# Patient Record
Sex: FEMALE | Race: WHITE | NOT HISPANIC OR LATINO | URBAN - NONMETROPOLITAN AREA
[De-identification: names, ages, dates, MRNs, and addresses within clinical notes are randomized per-mention and may not be internally consistent; named-entity substitution may affect disease eponyms.]

---

## 2017-06-02 ENCOUNTER — OCCUPATIONAL MEDICINE (OUTPATIENT)
Dept: URGENT CARE | Facility: PHYSICIAN GROUP | Age: 22
End: 2017-06-02

## 2017-06-02 ENCOUNTER — HOSPITAL ENCOUNTER (OUTPATIENT)
Facility: MEDICAL CENTER | Age: 22
End: 2017-06-02
Attending: FAMILY MEDICINE
Payer: COMMERCIAL

## 2017-06-02 VITALS
TEMPERATURE: 98.2 F | HEART RATE: 74 BPM | HEIGHT: 61 IN | DIASTOLIC BLOOD PRESSURE: 82 MMHG | WEIGHT: 106.2 LBS | OXYGEN SATURATION: 98 % | BODY MASS INDEX: 20.05 KG/M2 | RESPIRATION RATE: 16 BRPM | SYSTOLIC BLOOD PRESSURE: 102 MMHG

## 2017-06-02 DIAGNOSIS — Z02.1 PRE-EMPLOYMENT DRUG SCREENING: ICD-10-CM

## 2017-06-02 DIAGNOSIS — Z02.1 PRE-EMPLOYMENT EXAMINATION: ICD-10-CM

## 2017-06-02 LAB
AMP AMPHETAMINE: NORMAL
COC COCAINE: NORMAL
INT CON NEG: NORMAL
INT CON POS: NORMAL
MET METHAMPHETAMINES: NORMAL
OPI OPIATES: NORMAL
PCP PHENCYCLIDINE: NORMAL
POC DRUG COMMENT 753798-OCCUPATIONAL HEALTH: NORMAL
THC: NORMAL

## 2017-06-02 PROCEDURE — 86480 TB TEST CELL IMMUN MEASURE: CPT | Performed by: FAMILY MEDICINE

## 2017-06-02 PROCEDURE — 80305 DRUG TEST PRSMV DIR OPT OBS: CPT | Performed by: FAMILY MEDICINE

## 2017-06-02 PROCEDURE — 8915 PR COMPREHENSIVE PHYSICAL: Performed by: FAMILY MEDICINE

## 2017-06-02 RX ORDER — TRAMADOL HYDROCHLORIDE 50 MG/1
50 TABLET ORAL EVERY 4 HOURS PRN
COMMUNITY
End: 2017-07-05

## 2017-06-02 RX ORDER — CITALOPRAM HYDROBROMIDE 10 MG/1
10 TABLET ORAL DAILY
COMMUNITY
End: 2018-09-09

## 2017-06-02 RX ORDER — GABAPENTIN 300 MG/1
300 CAPSULE ORAL 3 TIMES DAILY
COMMUNITY
End: 2018-09-09

## 2017-06-02 RX ORDER — TRAZODONE HYDROCHLORIDE 100 MG/1
100 TABLET ORAL NIGHTLY
COMMUNITY
End: 2017-07-05

## 2017-06-02 NOTE — MR AVS SNAPSHOT
"Regan Mckeon   2017 9:20 AM   Occupational Medicine   MRN: [id number]    Department:  [facility]   Dept Phone:  [phone]    Description:  Female : 1995   Provider:  Joao López M.D.           Reason for Visit     Employment Physical           Allergies as of 2017     Allergen Noted Reactions    Morphine 2017         You were diagnosed with     Pre-employment examination   [232417]       Pre-employment drug screening   [321172]         Vital Signs     Blood Pressure Pulse Temperature Respirations Height Weight    102/82 mmHg 74 36.8 °C (98.2 °F) 16 1.549 m (5' 1\") 48.172 kg (106 lb 3.2 oz)    Body Mass Index Oxygen Saturation Smoking Status             20.08 kg/m2 98% Current Every Day Smoker         Basic Information     Date Of Birth Sex Race Ethnicity Preferred Language    1995 Female White Non- English      Health Maintenance     Patient has no pending health maintenance at this time      Results     POCT 6 Panel Urine Drug Screen      Component    AMPHETAMINE    POC THC    COCAINE    OPIATES    PHENCYCLIDINE    METHAMPHETAMINES    POC Urine Drug Screen Comment    NEG    Internal Control Positive    Valid    Internal Control Negative    Valid                        Current Immunizations     No immunizations on file.      Below and/or attached are the medications your provider expects you to take. Review all of your home medications and newly ordered medications with your provider and/or pharmacist. Follow medication instructions as directed by your provider and/or pharmacist. Please keep your medication list with you and share with your provider. Update the information when medications are discontinued, doses are changed, or new medications (including over-the-counter products) are added; and carry medication information at all times in the event of emergency situations     Allergies:  (Not on file)          Medications  Valid as of: 2017 -  1:08 PM   "    Generic Name Brand Name Tablet Size Instructions for use    Citalopram Hydrobromide (Tab) CELEXA 10 MG Take 10 mg by mouth every day.        Gabapentin (Cap) NEURONTIN 300 MG Take 300 mg by mouth 3 times a day.        TraMADol HCl (Tab) ULTRAM 50 MG Take 50 mg by mouth every four hours as needed.        TraZODone HCl (Tab) DESYREL 100 MG Take 100 mg by mouth every evening.        .                 Medicines prescribed today were sent to:     None      Medication refill instructions:       If your prescription bottle indicates you have medication refills left, it is not necessary to call your provider’s office. Please contact your pharmacy and they will refill your medication.    If your prescription bottle indicates you do not have any refills left, you may request refills at any time through one of the following ways: The online VoIPshield Systems system (except Urgent Care), by calling your provider’s office, or by asking your pharmacy to contact your provider’s office with a refill request. Medication refills are processed only during regular business hours and may not be available until the next business day. Your provider may request additional information or to have a follow-up visit with you prior to refilling your medication.   *Please Note: Medication refills are assigned a new Rx number when refilled electronically. Your pharmacy may indicate that no refills were authorized even though a new prescription for the same medication is available at the pharmacy. Please request the medicine by name with the pharmacy before contacting your provider for a refill.        Your To Do List     Future Labs/Procedures Complete By Expires    QuantiFERON-TB Gold [TB TEST CELL IMM MEASURE AG]  As directed 6/2/2018         VoIPshield Systems Access Code: [email or web address]  Expires: 7/2/2017  1:08 PM    Your email address is not on file at [facility].  Email Addresses are required for you to sign up for VoIPshield Systems, please contact [phone]  to verify your personal information and to provide your email address prior to attempting to register for cycleWood Solutionst.    Regan Mckeon  [address] [place] [address] [place] [address]    FAD ? IOhart  A secure, online tool to manage your health information     [employer]’s GoodApril® is a secure, online tool that connects you to your personalized health information from the privacy of your home -- day or night - making it very easy for you to manage your healthcare. Once the activation process is completed, you can even access your medical information using the GoodApril fredi, which is available for free in the Apple Fredi store or Google Play store.     To learn more about GoodApril, visit [email or web address]    There are two levels of access available (as shown below):   My Chart Features  [facility] Primary Care Doctor [facility]  Specialists [facility]  Urgent  Care Non-[facility] Primary Care Doctor   Email your healthcare team securely and privately 24/7 X X X    Manage appointments: schedule your next appointment; view details of past/upcoming appointments X      Request prescription refills. X      View recent personal medical records, including lab and immunizations X X X X   View health record, including health history, allergies, medications X X X X   Read reports about your outpatient visits, procedures, consult and ER notes X X X X   See your discharge summary, which is a recap of your hospital and/or ER visit that includes your diagnosis, lab results, and care plan X X  X     How to register for cycleWood Solutionst:  Once your e-mail address has been verified, follow the following steps to sign up for cycleWood Solutionst.     1. Go to  [email or web address]  2. Click on the Sign Up Now box, which takes you to the New Member Sign Up page. You will need to provide the following information:  a. Enter your GoodApril Access Code exactly as it appears at the top of this page. (You will not need to use this code after you’ve completed the sign-up process. If you do not  sign up before the expiration date, you must request a new code.)   b. Enter your date of birth.   c. Enter your home email address.   d. Click Submit, and follow the next screen’s instructions.  3. Create a Bio2 Technologies ID. This will be your Bio2 Technologies login ID and cannot be changed, so think of one that is secure and easy to remember.  4. Create a Bio2 [employer] password. You can change your password at any time.  5. Enter your Password Reset Question and Answer. This can be used at a later time if you forget your password.   6. Enter your e-mail address. This allows you to receive e-mail notifications when new information is available in Bio2 Technologies.  7. Click Sign Up. You can now view your health information.    For assistance activating your [employer] account, call [phone]         Quit Tobacco Information     Do you want to quit using tobacco?    Quitting tobacco decreases risks of cancer, heart and lung disease, increases life expectancy, improves sense of taste and smell, and increases spending money, among other benefits.    If you are thinking about quitting, we can help.  • [facility] Quit Tobacco Program: 846.572.9454  o Program occurs weekly for four weeks and includes pharmacist consultation on products to support quitting smoking or chewing tobacco. A provider referral is needed for pharmacist consultation.  • Tobacco Users Help Hotline: 5-267-QUIT-NOW (554-8940) or https://[place].quitlogix.org/  o Free, confidential telephone and online coaching for [place] residents. Sessions are designed on a schedule that is convenient for you. Eligible clients receive free nicotine replacement therapy.  • Nationally: www.smokefree.gov  o Information and professional assistance to support both immediate and long-term needs as you become, and remain, a non-smoker. Smokefree.gov allows you to choose the help that best fits your needs.

## 2017-06-05 LAB
M TB TUBERC IFN-G BLD QL: NEGATIVE
M TB TUBERC IFN-G/MITOGEN IGNF BLD: 0.01
M TB TUBERC IGNF/MITOGEN IGNF CONTROL: 70.05 [IU]/ML
MITOGEN IGNF BCKGRD COR BLD-ACNC: 0.02 [IU]/ML

## 2017-07-05 ENCOUNTER — OFFICE VISIT (OUTPATIENT)
Dept: URGENT CARE | Facility: PHYSICIAN GROUP | Age: 22
End: 2017-07-05
Payer: MEDICAID

## 2017-07-05 ENCOUNTER — HOSPITAL ENCOUNTER (EMERGENCY)
Facility: MEDICAL CENTER | Age: 22
End: 2017-07-05
Attending: EMERGENCY MEDICINE
Payer: MEDICAID

## 2017-07-05 ENCOUNTER — APPOINTMENT (OUTPATIENT)
Dept: RADIOLOGY | Facility: MEDICAL CENTER | Age: 22
End: 2017-07-05
Attending: EMERGENCY MEDICINE
Payer: MEDICAID

## 2017-07-05 VITALS
OXYGEN SATURATION: 98 % | WEIGHT: 101 LBS | HEIGHT: 61 IN | HEART RATE: 83 BPM | BODY MASS INDEX: 19.07 KG/M2 | DIASTOLIC BLOOD PRESSURE: 80 MMHG | SYSTOLIC BLOOD PRESSURE: 120 MMHG | TEMPERATURE: 98.5 F | RESPIRATION RATE: 18 BRPM

## 2017-07-05 VITALS
SYSTOLIC BLOOD PRESSURE: 96 MMHG | HEART RATE: 82 BPM | WEIGHT: 99.87 LBS | HEIGHT: 61 IN | RESPIRATION RATE: 18 BRPM | DIASTOLIC BLOOD PRESSURE: 64 MMHG | OXYGEN SATURATION: 97 % | TEMPERATURE: 98.8 F | BODY MASS INDEX: 18.86 KG/M2

## 2017-07-05 DIAGNOSIS — R10.31 RIGHT LOWER QUADRANT ABDOMINAL PAIN: ICD-10-CM

## 2017-07-05 DIAGNOSIS — R11.2 NAUSEA AND VOMITING, INTRACTABILITY OF VOMITING NOT SPECIFIED, UNSPECIFIED VOMITING TYPE: ICD-10-CM

## 2017-07-05 DIAGNOSIS — R10.31 RIGHT LOWER QUADRANT PAIN: ICD-10-CM

## 2017-07-05 LAB
ALBUMIN SERPL BCP-MCNC: 5.4 G/DL (ref 3.2–4.9)
ALBUMIN/GLOB SERPL: 1.5 G/DL
ALP SERPL-CCNC: 87 U/L (ref 30–99)
ALT SERPL-CCNC: 19 U/L (ref 2–50)
AMPHET UR QL SCN: NEGATIVE
ANION GAP SERPL CALC-SCNC: 13 MMOL/L (ref 0–11.9)
APPEARANCE UR: CLEAR
APPEARANCE UR: CLEAR
AST SERPL-CCNC: 18 U/L (ref 12–45)
BARBITURATES UR QL SCN: NEGATIVE
BASOPHILS # BLD AUTO: 0.6 % (ref 0–1.8)
BASOPHILS # BLD: 0.05 K/UL (ref 0–0.12)
BENZODIAZ UR QL SCN: NEGATIVE
BILIRUB SERPL-MCNC: 1 MG/DL (ref 0.1–1.5)
BILIRUB UR QL STRIP.AUTO: NEGATIVE
BILIRUB UR STRIP-MCNC: NEGATIVE MG/DL
BUN SERPL-MCNC: 7 MG/DL (ref 8–22)
BZE UR QL SCN: NEGATIVE
CALCIUM SERPL-MCNC: 10.7 MG/DL (ref 8.5–10.5)
CANNABINOIDS UR QL SCN: NEGATIVE
CHLORIDE SERPL-SCNC: 105 MMOL/L (ref 96–112)
CO2 SERPL-SCNC: 19 MMOL/L (ref 20–33)
COLOR UR AUTO: NORMAL
COLOR UR: YELLOW
CREAT SERPL-MCNC: 0.9 MG/DL (ref 0.5–1.4)
CULTURE IF INDICATED INDCX: NO UA CULTURE
EOSINOPHIL # BLD AUTO: 0.05 K/UL (ref 0–0.51)
EOSINOPHIL NFR BLD: 0.6 % (ref 0–6.9)
ERYTHROCYTE [DISTWIDTH] IN BLOOD BY AUTOMATED COUNT: 46.9 FL (ref 35.9–50)
GFR SERPL CREATININE-BSD FRML MDRD: >60 ML/MIN/1.73 M 2
GLOBULIN SER CALC-MCNC: 3.7 G/DL (ref 1.9–3.5)
GLUCOSE SERPL-MCNC: 72 MG/DL (ref 65–99)
GLUCOSE UR STRIP.AUTO-MCNC: NEGATIVE MG/DL
GLUCOSE UR STRIP.AUTO-MCNC: NEGATIVE MG/DL
HCT VFR BLD AUTO: 52.2 % (ref 37–47)
HGB BLD-MCNC: 17.8 G/DL (ref 12–16)
IMM GRANULOCYTES # BLD AUTO: 0.02 K/UL (ref 0–0.11)
IMM GRANULOCYTES NFR BLD AUTO: 0.2 % (ref 0–0.9)
INT CON NEG: NEGATIVE
INT CON POS: POSITIVE
KETONES UR STRIP.AUTO-MCNC: 15 MG/DL
KETONES UR STRIP.AUTO-MCNC: NEGATIVE MG/DL
LEUKOCYTE ESTERASE UR QL STRIP.AUTO: NEGATIVE
LEUKOCYTE ESTERASE UR QL STRIP.AUTO: NEGATIVE
LIPASE SERPL-CCNC: 18 U/L (ref 11–82)
LYMPHOCYTES # BLD AUTO: 2.63 K/UL (ref 1–4.8)
LYMPHOCYTES NFR BLD: 31.8 % (ref 22–41)
MCH RBC QN AUTO: 32.2 PG (ref 27–33)
MCHC RBC AUTO-ENTMCNC: 34.1 G/DL (ref 33.6–35)
MCV RBC AUTO: 94.4 FL (ref 81.4–97.8)
METHADONE UR QL SCN: NEGATIVE
MICRO URNS: ABNORMAL
MONOCYTES # BLD AUTO: 0.53 K/UL (ref 0–0.85)
MONOCYTES NFR BLD AUTO: 6.4 % (ref 0–13.4)
NEUTROPHILS # BLD AUTO: 4.99 K/UL (ref 2–7.15)
NEUTROPHILS NFR BLD: 60.4 % (ref 44–72)
NITRITE UR QL STRIP.AUTO: NEGATIVE
NITRITE UR QL STRIP.AUTO: NEGATIVE
NRBC # BLD AUTO: 0 K/UL
NRBC BLD AUTO-RTO: 0 /100 WBC
OPIATES UR QL SCN: POSITIVE
OXYCODONE UR QL SCN: NEGATIVE
PCP UR QL SCN: NEGATIVE
PH UR STRIP.AUTO: 7 [PH]
PH UR STRIP.AUTO: 7 [PH] (ref 5–8)
PLATELET # BLD AUTO: 309 K/UL (ref 164–446)
PMV BLD AUTO: 10.3 FL (ref 9–12.9)
POC URINE PREGNANCY TEST: NEGATIVE
POTASSIUM SERPL-SCNC: 3.6 MMOL/L (ref 3.6–5.5)
PROPOXYPH UR QL SCN: NEGATIVE
PROT SERPL-MCNC: 9.1 G/DL (ref 6–8.2)
PROT UR QL STRIP: NEGATIVE MG/DL
PROT UR QL STRIP: NEGATIVE MG/DL
RBC # BLD AUTO: 5.53 M/UL (ref 4.2–5.4)
RBC UR QL AUTO: NEGATIVE
RBC UR QL AUTO: NEGATIVE
SODIUM SERPL-SCNC: 137 MMOL/L (ref 135–145)
SP GR UR STRIP.AUTO: 1.01
SP GR UR STRIP.AUTO: 1.03
UROBILINOGEN UR STRIP-MCNC: NEGATIVE MG/DL
WBC # BLD AUTO: 8.3 K/UL (ref 4.8–10.8)

## 2017-07-05 PROCEDURE — 99285 EMERGENCY DEPT VISIT HI MDM: CPT

## 2017-07-05 PROCEDURE — 80053 COMPREHEN METABOLIC PANEL: CPT

## 2017-07-05 PROCEDURE — 81003 URINALYSIS AUTO W/O SCOPE: CPT | Mod: 59

## 2017-07-05 PROCEDURE — 96375 TX/PRO/DX INJ NEW DRUG ADDON: CPT

## 2017-07-05 PROCEDURE — 81002 URINALYSIS NONAUTO W/O SCOPE: CPT | Performed by: PHYSICIAN ASSISTANT

## 2017-07-05 PROCEDURE — 700111 HCHG RX REV CODE 636 W/ 250 OVERRIDE (IP): Performed by: EMERGENCY MEDICINE

## 2017-07-05 PROCEDURE — 80307 DRUG TEST PRSMV CHEM ANLYZR: CPT

## 2017-07-05 PROCEDURE — 96374 THER/PROPH/DIAG INJ IV PUSH: CPT | Mod: XU

## 2017-07-05 PROCEDURE — 85025 COMPLETE CBC W/AUTO DIFF WBC: CPT

## 2017-07-05 PROCEDURE — 700117 HCHG RX CONTRAST REV CODE 255: Performed by: EMERGENCY MEDICINE

## 2017-07-05 PROCEDURE — 81025 URINE PREGNANCY TEST: CPT | Performed by: PHYSICIAN ASSISTANT

## 2017-07-05 PROCEDURE — 99203 OFFICE O/P NEW LOW 30 MIN: CPT | Mod: 25 | Performed by: PHYSICIAN ASSISTANT

## 2017-07-05 PROCEDURE — 83690 ASSAY OF LIPASE: CPT

## 2017-07-05 PROCEDURE — 74177 CT ABD & PELVIS W/CONTRAST: CPT

## 2017-07-05 RX ORDER — ONDANSETRON 2 MG/ML
4 INJECTION INTRAMUSCULAR; INTRAVENOUS ONCE
Status: COMPLETED | OUTPATIENT
Start: 2017-07-05 | End: 2017-07-05

## 2017-07-05 RX ORDER — ONDANSETRON 4 MG/1
4 TABLET, FILM COATED ORAL EVERY 8 HOURS PRN
Qty: 5 TAB | Refills: 1 | Status: SHIPPED | OUTPATIENT
Start: 2017-07-05 | End: 2018-01-25

## 2017-07-05 RX ORDER — HYDROCODONE BITARTRATE AND ACETAMINOPHEN 5; 325 MG/1; MG/1
1 TABLET ORAL EVERY 4 HOURS PRN
Qty: 6 TAB | Refills: 0 | Status: SHIPPED | OUTPATIENT
Start: 2017-07-05 | End: 2018-01-25

## 2017-07-05 RX ADMIN — ONDANSETRON 4 MG: 2 INJECTION INTRAMUSCULAR; INTRAVENOUS at 17:07

## 2017-07-05 RX ADMIN — IOHEXOL 100 ML: 350 INJECTION, SOLUTION INTRAVENOUS at 17:23

## 2017-07-05 RX ADMIN — HYDROMORPHONE HYDROCHLORIDE 0.5 MG: 1 INJECTION, SOLUTION INTRAMUSCULAR; INTRAVENOUS; SUBCUTANEOUS at 17:07

## 2017-07-05 ASSESSMENT — ENCOUNTER SYMPTOMS
NAUSEA: 1
DIARRHEA: 0
ABDOMINAL PAIN: 1
FEVER: 0
VOMITING: 1
SHORTNESS OF BREATH: 0

## 2017-07-05 ASSESSMENT — LIFESTYLE VARIABLES: DO YOU DRINK ALCOHOL: NO

## 2017-07-05 NOTE — ED AVS SNAPSHOT
7/5/2017    Regan Mckeon  [address] [place] [address]    Dear Regan:    [facility] wants to ensure your discharge home is safe and you or your loved ones have had all of your questions answered regarding your care after you leave the hospital.    Below is a list of resources and contact information should you have any questions regarding your hospital stay, follow-up instructions, or active medical symptoms.    Questions or Concerns Regarding… Contact   Medical Questions Related to Your Discharge  (7 days a week, 8am-5pm) Contact a Nurse Care Coordinator   [phone]   Medical Questions Not Related to Your Discharge  (24 hours a day / 7 days a week)  Contact the Nurse Health Line   [phone]    Medications or Discharge Instructions Refer to your discharge packet   or contact your [facility] Primary Care Provider   [phone]   Follow-up Appointment(s) Schedule your appointment via Cloudsnap   or contact Scheduling [phone]   Billing Review your statement via Cloudsnap  or contact Billing [phone]   Medical Records Review your records via Cloudsnap   or contact Medical Records [phone]     You may receive a telephone call within two days of discharge. This call is to make certain you understand your discharge instructions and have the opportunity to have any questions answered. You can also easily access your medical information, test results and upcoming appointments via the Cloudsnap free online health management tool. You can learn more and sign up at [employer]/Cloudsnap. For assistance setting up your Cloudsnap account, please call [phone].    Once again, we want to ensure your discharge home is safe and that you have a clear understanding of any next steps in your care. If you have any questions or concerns, please do not hesitate to contact us, we are here for you. Thank you for choosing [facility] for your healthcare needs.    Sincerely,    Your [facility] Healthcare Team

## 2017-07-05 NOTE — ED AVS SNAPSHOT
Teranode Access Code: [email or web address]  Expires: 8/4/2017  1:03 PM    Your email address is not on file at [facility].  Email Addresses are required for you to sign up for Teranode, please contact [phone] to verify your personal information and to provide your email address prior to attempting to register for Teranode.    [place] [address]    Teranode  A secure, online tool to manage your health information     [employer]’s Teranode® is a secure, online tool that connects you to your personalized health information from the privacy of your home -- day or night - making it very easy for you to manage your healthcare. Once the activation process is completed, you can even access your medical information using the Teranode fredi, which is available for free in the Apple Fredi store or Google Play store.     To learn more about Teranode, visit [email or web address] [email or web address]/Teranode    There are two levels of access available (as shown below):   My Chart Features  [facility] Primary Care Doctor [facility]  Specialists [facility]  Urgent  Care Non-[facility] Primary Care Doctor   Email your healthcare team securely and privately 24/7 X X X    Manage appointments: schedule your next appointment; view details of past/upcoming appointments X      Request prescription refills. X      View recent personal medical records, including lab and immunizations X X X X   View health record, including health history, allergies, medications X X X X   Read reports about your outpatient visits, procedures, consult and ER notes X X X X   See your discharge summary, which is a recap of your hospital and/or ER visit that includes your diagnosis, lab results, and care plan X X  X     How to register for Teranode:  Once your e-mail address has been verified, follow the following steps to sign up for Teranode.     1. Go to  [email or web address]  2. Click on the Sign Up Now box, which takes you to the New Member Sign Up page. You will need  to provide the following information:  a. Enter your Filmmortal Access Code exactly as it appears at the top of this page. (You will not need to use this code after you’ve completed the sign-up process. If you do not sign up before the expiration date, you must request a new code.)   b. Enter your date of birth.   c. Enter your home email address.   d. Click Submit, and follow the next screen’s instructions.  3. Create a Filmmortal ID. This will be your Filmmortal login ID and cannot be changed, so think of one that is secure and easy to remember.  4. Create a Filmmortal password. You can change your password at any time.  5. Enter your Password Reset Question and Answer. This can be used at a later time if you forget your password.   6. Enter your e-mail address. This allows you to receive e-mail notifications when new information is available in Filmmortal.  7. Click Sign Up. You can now view your health information.    For assistance activating your Filmmortal account, call [phone]

## 2017-07-05 NOTE — MR AVS SNAPSHOT
"        Regan Mckeon   2017 12:10 PM   Office Visit   MRN: [id number]    Department:  [facility]   Dept Phone:  [phone]    Description:  Female : 1995   Provider:  Niecy Hammer PA-C           Reason for Visit     Abdominal Pain LRQ pain, vomiting x 2 days      Allergies as of 2017     Allergen Noted Reactions    Morphine 2017         You were diagnosed with     Right lower quadrant pain   [806601]       Nausea and vomiting, intractability of vomiting not specified, unspecified vomiting type   [5607368]         Vital Signs     Blood Pressure Pulse Temperature Respirations Height Weight    120/80 mmHg 83 36.9 °C (98.5 °F) 18 1.549 m (5' 0.98\") 45.813 kg (101 lb)    Body Mass Index Oxygen Saturation Smoking Status             19.09 kg/m2 98% Current Every Day Smoker         Basic Information     Date Of Birth Sex Race Ethnicity Preferred Language    1995 Female White Non- English      Health Maintenance        Date Due Completion Dates    IMM HEP B VACCINE (1 of 3 - Primary Series) 1995 ---    IMM HEP A VACCINE (1 of 2 - Standard Series) 1996 ---    IMM HPV VACCINE (1 of 3 - Female 3 Dose Series) 2006 ---    IMM VARICELLA (CHICKENPOX) VACCINE (1 of 2 - 2 Dose Adolescent Series) 2008 ---    IMM DTaP/Tdap/Td Vaccine (1 - Tdap) 2014 ---    PAP SMEAR 2016 ---    IMM INFLUENZA (1) 2017 ---            Results     POCT PREGNANCY      Component Value Standard Range & Units    POC Urine Pregnancy Test negative Negative    Internal Control Positive Positive     Internal Control Negative Negative                 POCT Urinalysis      Component Value Standard Range & Units    POC Color light pale Negative    POC Appearance clear Negative    POC Leukocyte Esterase negative Negative    POC Nitrites negative Negative    POC Urobiligen negative Negative (0.2) mg/dL    POC Protein negative Negative mg/dL    POC Urine PH 7.0 5.0 - 8.0    POC Blood negative " Negative    POC Specific Gravity 1.010 <1.005 - >1.030    POC Ketones negative Negative mg/dL    POC Biliruben negative Negative mg/dL    POC Glucose negative Negative mg/dL                        Current Immunizations     No immunizations on file.      Below and/or attached are the medications your provider expects you to take. Review all of your home medications and newly ordered medications with your provider and/or pharmacist. Follow medication instructions as directed by your provider and/or pharmacist. Please keep your medication list with you and share with your provider. Update the information when medications are discontinued, doses are changed, or new medications (including over-the-counter products) are added; and carry medication information at all times in the event of emergency situations     Allergies:  MORPHINE - (reactions not documented)               Medications  Valid as of: July 05, 2017 -  1:03 PM    Generic Name Brand Name Tablet Size Instructions for use    Citalopram Hydrobromide (Tab) CELEXA 10 MG Take 10 mg by mouth every day.        Gabapentin (Cap) NEURONTIN 300 MG Take 300 mg by mouth 3 times a day.        TraMADol HCl (Tab) ULTRAM 50 MG Take 50 mg by mouth every four hours as needed.        TraZODone HCl (Tab) DESYREL 100 MG Take 100 mg by mouth every evening.        .                 Medicines prescribed today were sent to:     [facility] [address] [place] [address] AT [place] [address] [address] [place] [address] [id number] [facility] [address]    Phone: [phone] Fax: [phone]    Open 24 Hours?: No      Medication refill instructions:       If your prescription bottle indicates you have medication refills left, it is not necessary to call your provider’s office. Please contact your pharmacy and they will refill your medication.    If your prescription bottle indicates you do not have any refills left, you may request refills at any time through one of  the following ways: The online MyWobile system (except Urgent Care), by calling your provider’s office, or by asking your pharmacy to contact your provider’s office with a refill request. Medication refills are processed only during regular business hours and may not be available until the next business day. Your provider may request additional information or to have a follow-up visit with you prior to refilling your medication.   *Please Note: Medication refills are assigned a new Rx number when refilled electronically. Your pharmacy may indicate that no refills were authorized even though a new prescription for the same medication is available at the pharmacy. Please request the medicine by name with the pharmacy before contacting your provider for a refill.           MyWobile Access Code: [email or web address]  Expires: 8/4/2017  1:03 PM    MyWobile  A secure, online tool to manage your health information     THE NOCKLISTs MyWobile® is a secure, online tool that connects you to your personalized health information from the privacy of your home -- day or night - making it very easy for you to manage your healthcare. Once the activation process is completed, you can even access your medical information using the MyWobile fredi, which is available for free in the Apple Fredi store or Google Play store.     MyWobile provides the following levels of access (as shown below):   My Chart Features   [facility] Primary Care Doctor [facility]  Specialists [facility]  Urgent  Care Non-[facility]  Primary Care  Doctor   Email your healthcare team securely and privately 24/7 X X X    Manage appointments: schedule your next appointment; view details of past/upcoming appointments X      Request prescription refills. X      View recent personal medical records, including lab and immunizations X X X X   View health record, including health history, allergies, medications X X X X   Read reports about your outpatient visits, procedures, consult and ER notes X  X X X   See your discharge summary, which is a recap of your hospital and/or ER visit that includes your diagnosis, lab results, and care plan. X X       How to register for [employer] Capital:  1. Go to  [email or web address].  2. Click on the Sign Up Now box, which takes you to the New Member Sign Up page. You will need to provide the following information:  a. Enter your [employer] Access Code exactly as it appears at the top of this page. (You will not need to use this code after you’ve completed the sign-up process. If you do not sign up before the expiration date, you must request a new code.)   b. Enter your date of birth.   c. Enter your home email address.   d. Click Submit, and follow the next screen’s instructions.  3. Create a [employer] ID. This will be your [employer] login ID and cannot be changed, so think of one that is secure and easy to remember.  4. Create a [employer] password. You can change your password at any time.  5. Enter your Password Reset Question and Answer. This can be used at a later time if you forget your password.   6. Enter your e-mail address. This allows you to receive e-mail notifications when new information is available in [employer] Capital.  7. Click Sign Up. You can now view your health information.    For assistance activating your [employer] account, call [phone]        Quit Tobacco Information     Do you want to quit using tobacco?    Quitting tobacco decreases risks of cancer, heart and lung disease, increases life expectancy, improves sense of taste and smell, and increases spending money, among other benefits.    If you are thinking about quitting, we can help.  • [facility] Quit Tobacco Program: 616.141.9594  o Program occurs weekly for four weeks and includes pharmacist consultation on products to support quitting smoking or chewing tobacco. A provider referral is needed for pharmacist consultation.  • Tobacco Users Help Hotline: 4-414-QUIT-NOW (981-4598) or  https://[place].quitlogix.org/  o Free, confidential telephone and online coaching for [place] residents. Sessions are designed on a schedule that is convenient for you. Eligible clients receive free nicotine replacement therapy.  • Nationally: www.smokefree.gov  o Information and professional assistance to support both immediate and long-term needs as you become, and remain, a non-smoker. Smokefree.gov allows you to choose the help that best fits your needs.

## 2017-07-05 NOTE — ED AVS SNAPSHOT
Home Care Instructions                                                                                                                Regan Mckeon   MRN: [id number]    Department:  [facility], Emergency Dept   Date of Visit:  7/5/2017            [facility], Emergency Dept    [address]    [place] [address]    Phone:  [phone]      You were seen by     Gallito Mcrae M.D.      Your Diagnosis Was     Right lower quadrant abdominal pain     R10.31       These are the medications you received during your hospitalization from 07/05/2017 1402 to 07/05/2017 1801     Date/Time Order Dose Route Action    07/05/2017 1707 HYDROmorphone (DILAUDID) injection 0.5 mg 0.5 mg Intravenous Given    07/05/2017 1707 ondansetron (ZOFRAN) syringe/vial injection 4 mg 4 mg Intravenous Given    07/05/2017 1723 iohexol (OMNIPAQUE) 350 mg/mL 100 mL Intravenous Given      Medication Information     Review all of your home medications and newly ordered medications with your primary doctor and/or pharmacist as soon as possible. Follow medication instructions as directed by your doctor and/or pharmacist.     Please keep your complete medication list with you and share with your physician. Update the information when medications are discontinued, doses are changed, or new medications (including over-the-counter products) are added; and carry medication information at all times in the event of emergency situations.               Medication List      START taking these medications        Instructions    Morning Afternoon Evening Bedtime    hydrocodone-acetaminophen 5-325 MG Tabs per tablet   Commonly known as:  NORCO        Take 1 Tab by mouth every four hours as needed.   Dose:  1 Tab                        ondansetron 4 MG Tabs tablet   Commonly known as:  ZOFRAN        Take 1 Tab by mouth every 8 hours as needed.   Dose:  4 mg                          ASK your doctor about these medications        Instructions    Morning Afternoon Evening Bedtime    CELEXA 10 MG tablet   Generic drug:  citalopram        Take 10 mg by mouth every day.   Dose:  10 mg                        gabapentin 300 MG Caps   Commonly known as:  NEURONTIN        Take 300 mg by mouth 3 times a day.   Dose:  300 mg                             Where to Get Your Medications      You can get these medications from any pharmacy     Bring a paper prescription for each of these medications    - hydrocodone-acetaminophen 5-325 MG Tabs per tablet  - ondansetron 4 MG Tabs tablet            Procedures and tests performed during your visit     CBC WITH DIFFERENTIAL    COMP METABOLIC PANEL    CONSENT FOR CONTRAST INJECTION    CT-ABDOMEN-PELVIS WITH    ESTIMATED GFR    LIPASE    URINALYSIS CULTURE, IF INDICATED    URINE DRUG SCREEN        Discharge Instructions       Abdominal Pain, Women  Abdominal (stomach, pelvic, or belly) pain can be caused by many things. It is important to tell your doctor:  · The location of the pain.  · Does it come and go or is it present all the time?  · Are there things that start the pain (eating certain foods, exercise)?  · Are there other symptoms associated with the pain (fever, nausea, vomiting, diarrhea)?  All of this is helpful to know when trying to find the cause of the pain.  CAUSES   · Stomach: virus or bacteria infection, or ulcer.  · Intestine: appendicitis (inflamed appendix), regional ileitis (Crohn's disease), ulcerative colitis (inflamed colon), irritable bowel syndrome, diverticulitis (inflamed diverticulum of the colon), or cancer of the stomach or intestine.  · Gallbladder disease or stones in the gallbladder.  · Kidney disease, kidney stones, or infection.  · Pancreas infection or cancer.  · Fibromyalgia (pain disorder).  · Diseases of the female organs:  · Uterus: fibroid (non-cancerous) tumors or infection.  · Fallopian tubes: infection or tubal pregnancy.  · Ovary: cysts or tumors.  · Pelvic  adhesions (scar tissue).  · Endometriosis (uterus lining tissue growing in the pelvis and on the pelvic organs).  · Pelvic congestion syndrome (female organs filling up with blood just before the menstrual period).  · Pain with the menstrual period.  · Pain with ovulation (producing an egg).  · Pain with an IUD (intrauterine device, birth control) in the uterus.  · Cancer of the female organs.  · Functional pain (pain not caused by a disease, may improve without treatment).  · Psychological pain.  · Depression.  DIAGNOSIS   Your doctor will decide the seriousness of your pain by doing an examination.  · Blood tests.  · X-rays.  · Ultrasound.  · CT scan (computed tomography, special type of X-ray).  · MRI (magnetic resonance imaging).  · Cultures, for infection.  · Barium enema (dye inserted in the large intestine, to better view it with X-rays).  · Colonoscopy (looking in intestine with a lighted tube).  · Laparoscopy (minor surgery, looking in abdomen with a lighted tube).  · Major abdominal exploratory surgery (looking in abdomen with a large incision).  TREATMENT   The treatment will depend on the cause of the pain.   · Many cases can be observed and treated at home.  · Over-the-counter medicines recommended by your caregiver.  · Prescription medicine.  · Antibiotics, for infection.  · Birth control pills, for painful periods or for ovulation pain.  · Hormone treatment, for endometriosis.  · Nerve blocking injections.  · Physical therapy.  · Antidepressants.  · Counseling with a psychologist or psychiatrist.  · Minor or major surgery.  HOME CARE INSTRUCTIONS   · Do not take laxatives, unless directed by your caregiver.  · Take over-the-counter pain medicine only if ordered by your caregiver. Do not take aspirin because it can cause an upset stomach or bleeding.  · Try a clear liquid diet (broth or water) as ordered by your caregiver. Slowly move to a bland diet, as tolerated, if the pain is related to the stomach  or intestine.  · Have a thermometer and take your temperature several times a day, and record it.  · Bed rest and sleep, if it helps the pain.  · Avoid sexual intercourse, if it causes pain.  · Avoid stressful situations.  · Keep your follow-up appointments and tests, as your caregiver orders.  · If the pain does not go away with medicine or surgery, you may try:  · Acupuncture.  · Relaxation exercises (yoga, meditation).  · Group therapy.  · Counseling.  SEEK MEDICAL CARE IF:   · You notice certain foods cause stomach pain.  · Your home care treatment is not helping your pain.  · You need stronger pain medicine.  · You want your IUD removed.  · You feel faint or lightheaded.  · You develop nausea and vomiting.  · You develop a rash.  · You are having side effects or an allergy to your medicine.  SEEK IMMEDIATE MEDICAL CARE IF:   · Your pain does not go away or gets worse.  · You have a fever.  · Your pain is felt only in portions of the abdomen. The right side could possibly be appendicitis. The left lower portion of the abdomen could be colitis or diverticulitis.  · You are passing blood in your stools (bright red or black tarry stools, with or without vomiting).  · You have blood in your urine.  · You develop chills, with or without a fever.  · You pass out.  MAKE SURE YOU:   · Understand these instructions.  · Will watch your condition.  · Will get help right away if you are not doing well or get worse.    Return within 8 hours if not improved.  Document Released: 10/14/2008 Document Revised: 03/11/2013 Document Reviewed: 11/04/2010  [employer]® Patient Information ©2014 [employer].            Patient Information     Patient Information    Following emergency treatment: all patient requiring follow-up care must return either to a private physician or a clinic if your condition worsens before you are able to obtain further medical attention, please return to the emergency room.     Billing Information    At  [facility], we work to make the billing process streamlined for our patients.  Our Representatives are here to answer any questions you may have regarding your hospital bill.  If you have insurance coverage and have supplied your insurance information to us, we will submit a claim to your insurer on your behalf.  Should you have any questions regarding your bill, we can be reached online or by phone as follows:  Online: You are able pay your bills online or live chat with our representatives about any billing questions you may have. We are here to help Monday - Friday from 8:00am to 7:30pm and 9:00am - 12:00pm on Saturdays.  Please visit [email or web address]  for more information.   Phone:  [phone] or [phone]    Please note that your emergency physician, surgeon, pathologist, radiologist, anesthesiologist, and other specialists are not employed by [facility] and will therefore bill separately for their services.  Please contact them directly for any questions concerning their bills at the numbers below:     Emergency Physician Services:  [phone]  [facility]:  [phone]  [facility]:  [phone]  [facility]:  [phone]    1. Your final bill may vary from the amount quoted upon discharge if all procedures are not complete at that time, or if your doctor has additional procedures of which we are not aware. You will receive an additional bill if you return to the Emergency Department at [facility] for suture removal regardless of the facility of which the sutures were placed.     2. Please arrange for settlement of this account at the emergency registration.    3. All self-pay accounts are due in full at the time of treatment.  If you are unable to meet this obligation then payment is expected within 4-5 days.     4. If you have had radiology studies (CT, X-ray, Ultrasound, MRI), you have received a preliminary  result during your emergency department visit. Please contact the radiology department [phone] to receive a copy of your final result. Please discuss the Final result with your primary physician or with the follow up physician provided.     Crisis Hotline:  [place] Crisis Hotline:  5-186-ZRYAATN or 1-662.525.3977  [place] Crisis Hotline:    1-499.923.2206 or 010-238-6366         ED Discharge Follow Up Questions    1. In order to provide you with very good care, we would like to follow up with a phone call in the next few days.  May we have your permission to contact you?     YES /  NO    2. What is the best phone number to call you? (       )_____-__________    3. What is the best time to call you?      Morning  /  Afternoon  /  Evening                   Patient Signature:  ____________________________________________________________    Date:  ____________________________________________________________

## 2017-07-05 NOTE — ED NOTES
Pt from [facility], changed in to sera. IV established. Pt report abdominal pain and nausea x2 days. Worse since yesterday. +chills, did not record fever.

## 2017-07-05 NOTE — PROGRESS NOTES
Chief Complaint   Patient presents with   • Abdominal Pain     LRQ pain, vomiting x 2 days       HISTORY OF PRESENT ILLNESS: Patient is a 22 y.o. female who presents today for evaluation of right lower quadrant pain that started yesterday. Patient complains of constant, severe right lower quadrant abdominal pain. She complains of associated nausea and vomiting. She reports the vomiting is worse as the pain gets worse. She has not been able to hold down any over-the-counter medication. She denies any fevers and constipation. She had a hysterectomy in December due to endometriosis. She has not had any pain since her hysterectomy. She does still have an appendix.    There are no active problems to display for this patient.      Allergies:Morphine    Current Outpatient Prescriptions Ordered in [facility]   Medication Sig Dispense Refill   • gabapentin (NEURONTIN) 300 MG Cap Take 300 mg by mouth 3 times a day.     • citalopram (CELEXA) 10 MG tablet Take 10 mg by mouth every day.     • trazodone (DESYREL) 100 MG Tab Take 100 mg by mouth every evening.     • tramadol (ULTRAM) 50 MG Tab Take 50 mg by mouth every four hours as needed.       No current Epic-ordered facility-administered medications on file.       History reviewed. No pertinent past medical history.    Social History   Substance Use Topics   • Smoking status: Current Every Day Smoker -- 0.50 packs/day for 6 years     Types: Cigarettes   • Smokeless tobacco: Never Used   • Alcohol Use: No       No family status information on file.   History reviewed. No pertinent family history.    ROS:    Review of Systems   Constitutional: Negative for fever, chills, weight loss and malaise/fatigue.   HENT: Negative for ear pain, nosebleeds, congestion, sore throat and neck pain.    Eyes: Negative for blurred vision.   Respiratory: Negative for cough, sputum production, shortness of breath and wheezing.    Cardiovascular: Negative for chest pain, palpitations, orthopnea and leg  "swelling.   Gastrointestinal: Positive for nausea, vomiting, and right lower quadrant abdominal pain.  Genitourinary: Negative for dysuria, urgency and frequency.       Exam:  Blood pressure 120/80, pulse 83, temperature 36.9 °C (98.5 °F), resp. rate 18, height 1.549 m (5' 0.98\"), weight 45.813 kg (101 lb), SpO2 98 %.  General: Thin appearing female in mild distress due to pain.  HEENT: Head is grossly normal.  Pulmonary: Clear to ausculation and percussion.  Normal effort. No rales, ronchi, or wheezing.   Cardiovascular: Regular rate and rhythm without murmur.   Back: No CVA tenderness noted by palpation on the right side of the back causes right lower quadrant abdominal pain.  Abdomen: Soft, nondistended. Bowel sounds within normal limits. Right lower quadrant pain with guarding and rebound. Person right lower quadrant pain with right hip flexion.  Neurologic: Grossly nonfocal.  Lymph: No cervical lymphadenopathy noted.  Skin: No obvious lesions.  Psych: Normal mood. Alert and oriented x3. Judgment and insight is normal.    UA: Negative    Urine hCG: Negative    Assessment/Plan:  Referring patient to the emergency room to rule out appendicitis as CT is not available at this clinic.  1. Right lower quadrant pain  POCT PREGNANCY    POCT Urinalysis   2. Nausea and vomiting, intractability of vomiting not specified, unspecified vomiting type           "

## 2017-07-05 NOTE — ED PROVIDER NOTES
"ED Provider Note    Scribed for Gallito Mcrae M.D. by Stan Corley. 7/5/2017, 4:49 PM.    Primary care provider: Pcp Pt States None  Means of arrival: Walk In  History obtained from: Patient  History limited by: None     CHIEF COMPLAINT  Chief Complaint   Patient presents with   • RLQ Pain     started Monday,    • N/V     since Monday   • Sent from Urgent Care     in [place] to r/o appendicitis       HPI  Regan Mckeon is a 22 y.o. female who was sent from [facility] to the Emergency Department complaining of abdominal pain. The patient had an onset of right sided abdominal pain two days ago. Her abdominal pain has been worsening in severity in the last two days, which has stayed localized to her right quadrant. She rates her current abdominal pain as 9/10 in severity, which she describes as \"pressure\". There are no modifying factors of her abdominal pain. The patient has had associated nausea and vomiting with her abdominal pain. She has had a decreased appetite in the last two days secondary to vomiting. No fevers associated with her abdominal pain. The patient has a past medical history including laparoscopy secondary to endometriosis, and denies that her current pain feels similar to her endometriosis. The patient has a history of hysterectomy as well.    She denies any fever, shortness of breath, diarrhea, hematemesis, dysuria.      Review of old medical records shows no previous ED visits     REVIEW OF SYSTEMS  Review of Systems   Constitutional: Negative for fever.   Respiratory: Negative for shortness of breath.    Gastrointestinal: Positive for nausea, vomiting and abdominal pain. Negative for diarrhea.   Genitourinary: Negative for dysuria.        No hematemesis.    All other systems reviewed and are negative.    C.    PAST MEDICAL HISTORY   has a past medical history of Endometriosis.    SURGICAL HISTORY   has past surgical history that includes hysterectomy laparoscopy; laparoscopy; and dilation " "and curettage.    SOCIAL HISTORY  Social History   Substance Use Topics   • Smoking status: Current Every Day Smoker -- 0.50 packs/day for 6 years     Types: Cigarettes   • Smokeless tobacco: Never Used   • Alcohol Use: No      History   Drug Use No     FAMILY HISTORY  History reviewed. No pertinent family history.    CURRENT MEDICATIONS  Home Medications     Reviewed by Yadira Ibarra R.N. (Registered Nurse) on 07/05/17 at 1620  Med List Status: Complete    Medication Last Dose Status    citalopram (CELEXA) 10 MG tablet  Active    gabapentin (NEURONTIN) 300 MG Cap  Active              ALLERGIES  Allergies   Allergen Reactions   • Morphine Unspecified     \"makes my body burn and itch\"     PHYSICAL EXAM  VITAL SIGNS: BP 96/64 mmHg  Pulse 88  Temp(Src) 37 °C (98.6 °F) (Temporal)  Resp 18  Ht 1.549 m (5' 1\")  Wt 45.3 kg (99 lb 13.9 oz)  BMI 18.88 kg/m2  SpO2 100%    Constitutional: Speaking with quiet voice. Mild distress. Non-toxic appearance.   HENT: Normocephalic, Atraumatic, Bilateral external ears normal, Oropharynx moist, no evidence of dehydration, No oral exudates, Nose normal.   Eyes: PERRLA, EOMI, Conjunctiva normal, No discharge.   Neck: Normal range of motion, No tenderness, Supple, No stridor. No masses. No evidence of meningitis or meningismus.   Lymphatic: No lymphadenopathy noted.   Cardiovascular: Normal heart rate, Normal rhythm, No murmurs, No rubs, No gallops.   Thorax & Lungs: Normal breath sounds, No respiratory distress, No wheezing or rhonchi, No chest tenderness.   Abdomen: Bowel sounds quiet, Soft, Right lower quadrant tenderness, No masses, No pulsatile masses. No guarding or rebound. No evidence of peritoneal findings.  Skin: Warm, Dry, No erythema, No rash. No exanthem.   Extremities:  No edema, No tenderness, No cyanosis, No clubbing.   Musculoskeletal: Good range of motion in all major joints. No major deformities noted.   Neurologic: Alert & oriented x 3, Normal motor " function, No focal deficits noted.   Psychiatric: Affect normal, mood normal.                                                 DIAGNOSTIC STUDIES / PROCEDURES    LABS  Results for orders placed or performed during the hospital encounter of 07/05/17   CBC WITH DIFFERENTIAL   Result Value Ref Range    WBC 8.3 4.8 - 10.8 K/uL    RBC 5.53 (H) 4.20 - 5.40 M/uL    Hemoglobin 17.8 (H) 12.0 - 16.0 g/dL    Hematocrit 52.2 (H) 37.0 - 47.0 %    MCV 94.4 81.4 - 97.8 fL    MCH 32.2 27.0 - 33.0 pg    MCHC 34.1 33.6 - 35.0 g/dL    RDW 46.9 35.9 - 50.0 fL    Platelet Count 309 164 - 446 K/uL    MPV 10.3 9.0 - 12.9 fL    Neutrophils-Polys 60.40 44.00 - 72.00 %    Lymphocytes 31.80 22.00 - 41.00 %    Monocytes 6.40 0.00 - 13.40 %    Eosinophils 0.60 0.00 - 6.90 %    Basophils 0.60 0.00 - 1.80 %    Immature Granulocytes 0.20 0.00 - 0.90 %    Nucleated RBC 0.00 /100 WBC    Neutrophils (Absolute) 4.99 2.00 - 7.15 K/uL    Lymphs (Absolute) 2.63 1.00 - 4.80 K/uL    Monos (Absolute) 0.53 0.00 - 0.85 K/uL    Eos (Absolute) 0.05 0.00 - 0.51 K/uL    Baso (Absolute) 0.05 0.00 - 0.12 K/uL    Immature Granulocytes (abs) 0.02 0.00 - 0.11 K/uL    NRBC (Absolute) 0.00 K/uL   COMP METABOLIC PANEL   Result Value Ref Range    Sodium 137 135 - 145 mmol/L    Potassium 3.6 3.6 - 5.5 mmol/L    Chloride 105 96 - 112 mmol/L    Co2 19 (L) 20 - 33 mmol/L    Anion Gap 13.0 (H) 0.0 - 11.9    Glucose 72 65 - 99 mg/dL    Bun 7 (L) 8 - 22 mg/dL    Creatinine 0.90 0.50 - 1.40 mg/dL    Calcium 10.7 (H) 8.5 - 10.5 mg/dL    AST(SGOT) 18 12 - 45 U/L    ALT(SGPT) 19 2 - 50 U/L    Alkaline Phosphatase 87 30 - 99 U/L    Total Bilirubin 1.0 0.1 - 1.5 mg/dL    Albumin 5.4 (H) 3.2 - 4.9 g/dL    Total Protein 9.1 (H) 6.0 - 8.2 g/dL    Globulin 3.7 (H) 1.9 - 3.5 g/dL    A-G Ratio 1.5 g/dL   LIPASE   Result Value Ref Range    Lipase 18 11 - 82 U/L   URINALYSIS CULTURE, IF INDICATED   Result Value Ref Range    Color Yellow     Character Clear     Specific Gravity 1.028 <1.035     Ph 7.0 5.0-8.0    Glucose Negative Negative mg/dL    Ketones 15 (A) Negative mg/dL    Protein Negative Negative mg/dL    Bilirubin Negative Negative    Nitrite Negative Negative    Leukocyte Esterase Negative Negative    Occult Blood Negative Negative    Micro Urine Req see below     Culture Indicated No UA Culture   ESTIMATED GFR   Result Value Ref Range    GFR If African American >60 >60 mL/min/1.73 m 2    GFR If Non African American >60 >60 mL/min/1.73 m 2      All labs reviewed by me.    RADIOLOGY  CT-ABDOMEN-PELVIS WITH   Final Result      No evidence of abdominal or pelvic mass, adenopathy, or inflammatory change.        The radiologist's interpretation of all radiological studies have been reviewed by me.    COURSE & MEDICAL DECISION MAKING  Nursing notes, VS, PMSFHx reviewed in chart.    Review of old medical records shows no previous ED visits     4:49 PM - Patient seen and examined at bedside. Patient will be treated with Dilaudid 0.5 mg IV, Zofran 4 mg IV. Ordered CT Abdomen Pelvis, urine drug screen, CBC, CMP, lipase, urinalysis, GFR to evaluate her symptoms. The differential diagnoses include but are not limited to: Surgical versus nonsurgical abdominal pain appendicitis. Patient has had hysterectomy.     5:49 PM Recheck: Patient re-evaluated at beside. Patient reports feeling mild improvement of her pain. She was updated of her lab and radiology results. Patient was provided with discharge instructions which she understands.     I reviewed prescription monitoring program for patient's narcotic use before prescribing a scheduled drug.The patient will not drink alcohol nor drive with prescribed medications. The patient will return for new or worsening symptoms and is stable at the time of discharge.    The patient is referred to a primary physician for blood pressure management, diabetic screening, and for all other preventative health concerns.    5:55 PM reviewed findings. No evidence of acute surgical  abdomen. Patient was discharged with close follow-up. Patient instructed she must return within 8 hours of no resolution of pain. Instruction sheet on abdominal pain in women.    DISPOSITION:  Patient will be discharged home in stable condition.    FOLLOW UP:  No follow-up provider specified.    OUTPATIENT MEDICATIONS:  New Prescriptions    HYDROCODONE-ACETAMINOPHEN (NORCO) 5-325 MG TAB PER TABLET    Take 1 Tab by mouth every four hours as needed.    ONDANSETRON (ZOFRAN) 4 MG TAB TABLET    Take 1 Tab by mouth every 8 hours as needed.         FINAL IMPRESSION  1. Right lower quadrant abdominal pain          Stan SCHAEFER (Scribe), am scribing for, and in the presence of, Gallito Mcrae M.D..    Electronically signed by: Stan Corley (Paul), 7/5/2017    Gallito SCHAEFER M.D. personally performed the services described in this documentation, as scribed by Stan Corley in my presence, and it is both accurate and complete.    The note accurately reflects work and decisions made by me.  Gallito Mcrae  7/5/2017  5:56 PM

## 2017-07-05 NOTE — ED NOTES
Regan Mckeon  Chief Complaint   Patient presents with   • RLQ Pain     started Monday,    • N/V     since Monday   • Sent from Urgent Care     in [place] to r/o appendicitis     Pt ambulatory to triage with above complaint.  VSS.  C/o pain 9/10 at this time,  Appears uncomfortable in triage.  Pt returned to lobby, educated on triage process, and to inform staff of any changes or concerns.  Charge RN aware of pt.

## 2017-07-06 NOTE — ED NOTES
DC instructions discussed with patient and family for abdominal pain. She has been provided written and verbal instructions including worsening s/s of when to return to ED. Pt and family verbalized understanding of all DC instructions, follow up and prescriptions discussed.   Pt ambulated to lobby with upright and steady gait. All DC paperwork with patient at time of DC.

## 2017-07-06 NOTE — DISCHARGE INSTRUCTIONS
Abdominal Pain, Women  Abdominal (stomach, pelvic, or belly) pain can be caused by many things. It is important to tell your doctor:  · The location of the pain.  · Does it come and go or is it present all the time?  · Are there things that start the pain (eating certain foods, exercise)?  · Are there other symptoms associated with the pain (fever, nausea, vomiting, diarrhea)?  All of this is helpful to know when trying to find the cause of the pain.  CAUSES   · Stomach: virus or bacteria infection, or ulcer.  · Intestine: appendicitis (inflamed appendix), regional ileitis (Crohn's disease), ulcerative colitis (inflamed colon), irritable bowel syndrome, diverticulitis (inflamed diverticulum of the colon), or cancer of the stomach or intestine.  · Gallbladder disease or stones in the gallbladder.  · Kidney disease, kidney stones, or infection.  · Pancreas infection or cancer.  · Fibromyalgia (pain disorder).  · Diseases of the female organs:  · Uterus: fibroid (non-cancerous) tumors or infection.  · Fallopian tubes: infection or tubal pregnancy.  · Ovary: cysts or tumors.  · Pelvic adhesions (scar tissue).  · Endometriosis (uterus lining tissue growing in the pelvis and on the pelvic organs).  · Pelvic congestion syndrome (female organs filling up with blood just before the menstrual period).  · Pain with the menstrual period.  · Pain with ovulation (producing an egg).  · Pain with an IUD (intrauterine device, birth control) in the uterus.  · Cancer of the female organs.  · Functional pain (pain not caused by a disease, may improve without treatment).  · Psychological pain.  · Depression.  DIAGNOSIS   Your doctor will decide the seriousness of your pain by doing an examination.  · Blood tests.  · X-rays.  · Ultrasound.  · CT scan (computed tomography, special type of X-ray).  · MRI (magnetic resonance imaging).  · Cultures, for infection.  · Barium enema (dye inserted in the large intestine, to better view it with  X-rays).  · Colonoscopy (looking in intestine with a lighted tube).  · Laparoscopy (minor surgery, looking in abdomen with a lighted tube).  · Major abdominal exploratory surgery (looking in abdomen with a large incision).  TREATMENT   The treatment will depend on the cause of the pain.   · Many cases can be observed and treated at home.  · Over-the-counter medicines recommended by your caregiver.  · Prescription medicine.  · Antibiotics, for infection.  · Birth control pills, for painful periods or for ovulation pain.  · Hormone treatment, for endometriosis.  · Nerve blocking injections.  · Physical therapy.  · Antidepressants.  · Counseling with a psychologist or psychiatrist.  · Minor or major surgery.  HOME CARE INSTRUCTIONS   · Do not take laxatives, unless directed by your caregiver.  · Take over-the-counter pain medicine only if ordered by your caregiver. Do not take aspirin because it can cause an upset stomach or bleeding.  · Try a clear liquid diet (broth or water) as ordered by your caregiver. Slowly move to a bland diet, as tolerated, if the pain is related to the stomach or intestine.  · Have a thermometer and take your temperature several times a day, and record it.  · Bed rest and sleep, if it helps the pain.  · Avoid sexual intercourse, if it causes pain.  · Avoid stressful situations.  · Keep your follow-up appointments and tests, as your caregiver orders.  · If the pain does not go away with medicine or surgery, you may try:  · Acupuncture.  · Relaxation exercises (yoga, meditation).  · Group therapy.  · Counseling.  SEEK MEDICAL CARE IF:   · You notice certain foods cause stomach pain.  · Your home care treatment is not helping your pain.  · You need stronger pain medicine.  · You want your IUD removed.  · You feel faint or lightheaded.  · You develop nausea and vomiting.  · You develop a rash.  · You are having side effects or an allergy to your medicine.  SEEK IMMEDIATE MEDICAL CARE IF:   · Your  pain does not go away or gets worse.  · You have a fever.  · Your pain is felt only in portions of the abdomen. The right side could possibly be appendicitis. The left lower portion of the abdomen could be colitis or diverticulitis.  · You are passing blood in your stools (bright red or black tarry stools, with or without vomiting).  · You have blood in your urine.  · You develop chills, with or without a fever.  · You pass out.  MAKE SURE YOU:   · Understand these instructions.  · Will watch your condition.  · Will get help right away if you are not doing well or get worse.    Return within 8 hours if not improved.  Document Released: 10/14/2008 Document Revised: 03/11/2013 Document Reviewed: 11/04/2010  [employer]® Patient Information ©2014 [employer].

## 2017-07-07 ENCOUNTER — HOSPITAL ENCOUNTER (EMERGENCY)
Facility: MEDICAL CENTER | Age: 22
End: 2017-07-07
Payer: MEDICAID

## 2017-07-07 ENCOUNTER — PATIENT OUTREACH (OUTPATIENT)
Dept: HEALTH INFORMATION MANAGEMENT | Facility: OTHER | Age: 22
End: 2017-07-07

## 2017-07-07 VITALS
TEMPERATURE: 98.1 F | DIASTOLIC BLOOD PRESSURE: 63 MMHG | HEART RATE: 79 BPM | OXYGEN SATURATION: 100 % | SYSTOLIC BLOOD PRESSURE: 103 MMHG | RESPIRATION RATE: 18 BRPM | HEIGHT: 61 IN

## 2017-07-07 PROCEDURE — 302449 STATCHG TRIAGE ONLY (STATISTIC)

## 2017-07-07 ASSESSMENT — PAIN SCALES - GENERAL: PAINLEVEL_OUTOF10: 10

## 2017-07-07 NOTE — PROGRESS NOTES
07/07/2017 1038 - Discharge Outreach - received call from patient. States she was in ED on 7/5 for abd pain. States they were concerned it was her appendix. Advised by ED to return in 8 hours if pain continued. States pain has continued without any improvement - Advised to return to ED.

## 2017-07-07 NOTE — ED NOTES
Pt c/o abd pain. Pt seen here on 7/5 for same, CT scan and full lab work. Pt c/o still having pain.

## 2018-01-17 ENCOUNTER — HOSPITAL ENCOUNTER (OUTPATIENT)
Facility: MEDICAL CENTER | Age: 23
End: 2018-01-17
Attending: INTERNAL MEDICINE
Payer: MEDICAID

## 2018-01-17 ENCOUNTER — HOSPITAL ENCOUNTER (OUTPATIENT)
Dept: LAB | Facility: MEDICAL CENTER | Age: 23
End: 2018-01-17
Attending: INTERNAL MEDICINE
Payer: MEDICAID

## 2018-01-17 LAB
CRP SERPL HS-MCNC: 0.02 MG/DL (ref 0–0.75)
T4 FREE SERPL-MCNC: 0.75 NG/DL (ref 0.53–1.43)
TSH SERPL DL<=0.005 MIU/L-ACNC: 1.4 UIU/ML (ref 0.38–5.33)

## 2018-01-17 PROCEDURE — 86140 C-REACTIVE PROTEIN: CPT

## 2018-01-17 PROCEDURE — 84443 ASSAY THYROID STIM HORMONE: CPT

## 2018-01-17 PROCEDURE — 83993 ASSAY FOR CALPROTECTIN FECAL: CPT

## 2018-01-17 PROCEDURE — 87329 GIARDIA AG IA: CPT

## 2018-01-17 PROCEDURE — 36415 COLL VENOUS BLD VENIPUNCTURE: CPT

## 2018-01-17 PROCEDURE — 87338 HPYLORI STOOL AG IA: CPT

## 2018-01-17 PROCEDURE — 82784 ASSAY IGA/IGD/IGG/IGM EACH: CPT

## 2018-01-17 PROCEDURE — 84439 ASSAY OF FREE THYROXINE: CPT

## 2018-01-17 PROCEDURE — 87328 CRYPTOSPORIDIUM AG IA: CPT

## 2018-01-17 PROCEDURE — 83516 IMMUNOASSAY NONANTIBODY: CPT

## 2018-01-18 LAB — H PYLORI AG STL QL IA: NOT DETECTED

## 2018-01-19 LAB
G LAMBLIA+C PARVUM AG STL QL RAPID: NORMAL
GLIADIN PEPTIDE+TTG IGA+IGG SER QL IA: 6 UNITS (ref 0–19)
IGA SERPL-MCNC: 224 MG/DL (ref 68–408)
SIGNIFICANT IND 70042: NORMAL
SITE SITE: NORMAL
SOURCE SOURCE: NORMAL

## 2018-01-21 LAB — CALPROTECTIN STL-MCNT: <16 UG/G

## 2018-01-25 ENCOUNTER — OFFICE VISIT (OUTPATIENT)
Dept: URGENT CARE | Facility: PHYSICIAN GROUP | Age: 23
End: 2018-01-25
Payer: MEDICAID

## 2018-01-25 VITALS
SYSTOLIC BLOOD PRESSURE: 118 MMHG | HEART RATE: 87 BPM | BODY MASS INDEX: 19.33 KG/M2 | OXYGEN SATURATION: 98 % | TEMPERATURE: 99.4 F | DIASTOLIC BLOOD PRESSURE: 80 MMHG | WEIGHT: 102.4 LBS | RESPIRATION RATE: 16 BRPM | HEIGHT: 61 IN

## 2018-01-25 DIAGNOSIS — R50.9 FEVER, UNSPECIFIED FEVER CAUSE: ICD-10-CM

## 2018-01-25 DIAGNOSIS — J22 ACUTE RESPIRATORY INFECTION: ICD-10-CM

## 2018-01-25 DIAGNOSIS — R51.9 ACUTE INTRACTABLE HEADACHE, UNSPECIFIED HEADACHE TYPE: ICD-10-CM

## 2018-01-25 DIAGNOSIS — R11.0 NAUSEA: ICD-10-CM

## 2018-01-25 LAB
FLUAV+FLUBV AG SPEC QL IA: NEGATIVE
INT CON NEG: NORMAL
INT CON POS: NORMAL

## 2018-01-25 PROCEDURE — 87804 INFLUENZA ASSAY W/OPTIC: CPT | Performed by: FAMILY MEDICINE

## 2018-01-25 PROCEDURE — 99214 OFFICE O/P EST MOD 30 MIN: CPT | Performed by: FAMILY MEDICINE

## 2018-01-25 RX ORDER — KETOROLAC TROMETHAMINE 30 MG/ML
30 INJECTION, SOLUTION INTRAMUSCULAR; INTRAVENOUS ONCE
Status: COMPLETED | OUTPATIENT
Start: 2018-01-25 | End: 2018-01-25

## 2018-01-25 RX ORDER — BUTALBITAL, ACETAMINOPHEN AND CAFFEINE 50; 325; 40 MG/1; MG/1; MG/1
TABLET ORAL
Qty: 20 TAB | Refills: 0 | Status: SHIPPED | OUTPATIENT
Start: 2018-01-25 | End: 2018-09-09

## 2018-01-25 RX ORDER — ONDANSETRON 4 MG/1
4 TABLET, ORALLY DISINTEGRATING ORAL ONCE
Status: COMPLETED | OUTPATIENT
Start: 2018-01-25 | End: 2018-01-25

## 2018-01-25 RX ORDER — ONDANSETRON 4 MG/1
TABLET, ORALLY DISINTEGRATING ORAL
Qty: 15 TAB | Refills: 0 | Status: SHIPPED | OUTPATIENT
Start: 2018-01-25 | End: 2018-08-29 | Stop reason: SDUPTHER

## 2018-01-25 RX ADMIN — ONDANSETRON 4 MG: 4 TABLET, ORALLY DISINTEGRATING ORAL at 18:21

## 2018-01-25 RX ADMIN — KETOROLAC TROMETHAMINE 30 MG: 30 INJECTION, SOLUTION INTRAMUSCULAR; INTRAVENOUS at 18:21

## 2018-01-25 ASSESSMENT — PAIN SCALES - GENERAL: PAINLEVEL: 10=SEVERE PAIN

## 2018-01-26 NOTE — PROGRESS NOTES
Chief Complaint:    Chief Complaint   Patient presents with   • Headache     started today   • Cough     only at night       History of Present Illness:    This is a new problem. Today started with headache. It is severe now. Has sensitivity to lights and sounds. Has nausea. No vomiting, diarrhea, or abdominal pain.  Has history of Migraine Headaches and symptoms feel typical. Toradol IM and Zofran ODT have been helpful in past.     Today started with fever of 104 F. Has had nasal symptoms and cough x 2 days. No sore throat. Took Tylenol for fever.      Review of Systems:    Constitutional: See HPI.    Eyes: See HPI.    ENT: See HPI.    Respiratory: See HPI.    Cardiovascular: Negative for chest pain, palpitations, orthopnea, claudication, leg swelling, and PND.   Gastrointestinal: See HPI.    Genitourinary: Negative for dysuria, urinary urgency, urinary frequency, hematuria, and flank pain.   Musculoskeletal: Negative for myalgias, joint pain, neck pain, and back pain.   Skin: Negative for rash and itching.   Neurological: See HPI.    Endo: Negative for polydipsia.   Heme: Does not bruise/bleed easily.   Psychiatric/Behavioral: No new symptoms.      Past Medical History:    Past Medical History:   Diagnosis Date   • Endometriosis        Past Surgical History:    Past Surgical History:   Procedure Laterality Date   • DILATION AND CURETTAGE     • HYSTERECTOMY LAPAROSCOPY     • LAPAROSCOPY      endometriosis       Social History:    Social History     Social History   • Marital status: Single     Spouse name: N/A   • Number of children: N/A   • Years of education: N/A     Occupational History   • Not on file.     Social History Main Topics   • Smoking status: Current Every Day Smoker     Packs/day: 0.50     Years: 6.00     Types: Cigarettes   • Smokeless tobacco: Never Used   • Alcohol use No   • Drug use: No   • Sexual activity: Not on file     Other Topics Concern   • Not on file     Social History Narrative   • No  "narrative on file       Family History:    History reviewed. No pertinent family history.    Medications:    Current Outpatient Prescriptions on File Prior to Visit   Medication Sig Dispense Refill   • gabapentin (NEURONTIN) 300 MG Cap Take 300 mg by mouth 3 times a day.     • citalopram (CELEXA) 10 MG tablet Take 10 mg by mouth every day.       No current facility-administered medications on file prior to visit.        Allergies:    Allergies   Allergen Reactions   • Morphine Unspecified     \"makes my body burn and itch\"       Vitals:    Vitals:    01/25/18 1807   BP: 118/80   Pulse: 87   Resp: 16   Temp: 37.4 °C (99.4 °F)   SpO2: 98%   Weight: 46.4 kg (102 lb 6.4 oz)   Height: 1.549 m (5' 1\")       Physical Exam:    Constitutional: Vital signs reviewed. Appears well-developed and well-nourished. Appears to be in pain from headache. Lights are dimmed in room due to her photophobia.  Eyes: Bilateral photophobia. Sclera white, conjunctivae clear. PERRLA.  ENT: External ears normal. External auditory canals normal without discharge. TMs translucent and non-bulging. Hearing normal. Nasal mucosa pink. Lips/teeth are normal. Oral mucosa pink and moist. Posterior pharynx: WNL.  Neck: Neck supple.   Cardiovascular: Regular rate and rhythm. No murmur.  Pulmonary/Chest: Respirations non-labored. Clear to auscultation bilaterally.  Abdomen: Bowel sounds are normal active. Soft, non-distended, and non-tender to palpation.  Lymph: Cervical nodes without tenderness or enlargement.  Musculoskeletal: Normal gait. Normal range of motion. No muscular atrophy or weakness.  Neurological: Alert and oriented to person, place, and time. CN 2-12 intact. Muscle tone normal. Coordination normal.   Skin: No rashes or lesions. Warm, dry, normal turgor.  Psychiatric: Normal mood and affect. Behavior is normal. Judgment and thought content normal.     Diagnostics:    POCT INFLUENZA A/B (Order #[id number]) on 1/25/18   Component Results "     Component   Rapid Influenza A-B   Negative    Internal Control Positive   Valid    Internal Control Negative   Valid    Last Resulted Time   Thu Jan 25, 2018  6:46 PM       Assessment / Plan:    1. Acute intractable headache, unspecified headache type  - ketorolac (TORADOL) injection 30 mg; 1 mL by Intramuscular route Once.  - acetaminophen/caffeine/butalbital 325-40-50 mg (FIORICET) -40 MG Tab; 1 TAB EVERY 6 HOURS ONLY IF NEEDED FOR HEADACHE.  Dispense: 20 Tab; Refill: 0    2. Fever, unspecified fever cause  - POCT Influenza A/B    3. Nausea  - ondansetron (ZOFRAN ODT) dispertab 4 mg; Take 1 Tab by mouth Once.  - ondansetron (ZOFRAN ODT) 4 MG TABLET DISPERSIBLE; 1 TAB, ALLOW TO DISINTEGRATE IN MOUTH, EVERY 8 HOURS ONLY IF NEEDED FOR NAUSEA OR VOMITING.  Dispense: 15 Tab; Refill: 0    4. Acute respiratory infection - Rec'd further observation and follow-up if getting worse, change in symptoms, or not better with time.      Discussed with her DDX and management options.    Agreeable to medications given and prescribed.  report checked - last Rx was Alprazolam 1 mg #5 filled on 1/5/18. No other Rx x 3 years.    Follow-up with PCP or urgent care if getting worse or not better with above.

## 2018-04-25 ENCOUNTER — OFFICE VISIT (OUTPATIENT)
Dept: URGENT CARE | Facility: PHYSICIAN GROUP | Age: 23
End: 2018-04-25
Payer: MEDICAID

## 2018-04-25 VITALS
DIASTOLIC BLOOD PRESSURE: 62 MMHG | RESPIRATION RATE: 18 BRPM | TEMPERATURE: 98.5 F | SYSTOLIC BLOOD PRESSURE: 102 MMHG | BODY MASS INDEX: 19.26 KG/M2 | HEIGHT: 61 IN | OXYGEN SATURATION: 97 % | WEIGHT: 102 LBS | HEART RATE: 84 BPM

## 2018-04-25 DIAGNOSIS — R10.2 SUPRAPUBIC PAIN: ICD-10-CM

## 2018-04-25 DIAGNOSIS — M25.511 ACUTE PAIN OF RIGHT SHOULDER: ICD-10-CM

## 2018-04-25 DIAGNOSIS — R10.11 RUQ PAIN: ICD-10-CM

## 2018-04-25 PROCEDURE — 99214 OFFICE O/P EST MOD 30 MIN: CPT | Performed by: PHYSICIAN ASSISTANT

## 2018-04-25 RX ORDER — IBUPROFEN 600 MG/1
600 TABLET ORAL EVERY 6 HOURS PRN
Qty: 30 TAB | Refills: 0 | Status: SHIPPED | OUTPATIENT
Start: 2018-04-25 | End: 2018-08-29

## 2018-04-26 NOTE — PROGRESS NOTES
Chief Complaint   Patient presents with   • RUQ Pain     Sharp pain in RUQ, Pain radiates to back and up towards shoulder.       HISTORY OF PRESENT ILLNESS: Patient is a 23 y.o. female who presents today for the following:    RUQ pain x yesterday  Radiates around to the back and right shoulder  + hot flashes, intermittent dizziness with hot flashes, nausea without vomiting  LMP: partial hysterectomy 12/2016 due to endometriosis, one ovary remains  Last bowel movement: this morning; loose stool since last night  Denies injury and recent illness  OTC meds tried: none  No h/o the same     There are no active problems to display for this patient.      Allergies:Morphine    Current Outpatient Prescriptions Ordered in [facility]   Medication Sig Dispense Refill   • ibuprofen (MOTRIN) 600 MG Tab Take 1 Tab by mouth every 6 hours as needed for Mild Pain or Moderate Pain. 30 Tab 0   • ondansetron (ZOFRAN ODT) 4 MG TABLET DISPERSIBLE 1 TAB, ALLOW TO DISINTEGRATE IN MOUTH, EVERY 8 HOURS ONLY IF NEEDED FOR NAUSEA OR VOMITING. 15 Tab 0   • acetaminophen/caffeine/butalbital 325-40-50 mg (FIORICET) -40 MG Tab 1 TAB EVERY 6 HOURS ONLY IF NEEDED FOR HEADACHE. 20 Tab 0   • gabapentin (NEURONTIN) 300 MG Cap Take 300 mg by mouth 3 times a day.     • citalopram (CELEXA) 10 MG tablet Take 10 mg by mouth every day.       No current [facility]-ordered facility-administered medications on file.        Past Medical History:   Diagnosis Date   • Endometriosis        Social History   Substance Use Topics   • Smoking status: Current Every Day Smoker     Packs/day: 0.50     Years: 6.00     Types: Cigarettes   • Smokeless tobacco: Never Used   • Alcohol use No       No family status information on file.   History reviewed. No pertinent family history.    ROS:    Review of Systems   Constitutional: Negative for weight loss and malaise/fatigue.   HENT: Negative for ear pain, nosebleeds, congestion, sore throat and neck pain.    Eyes: Negative for blurred  "vision.   Respiratory: Negative for cough, sputum production, shortness of breath and wheezing.    Cardiovascular: Negative for chest pain, palpitations, orthopnea and leg swelling.   Gastrointestinal: Negative for heartburn, vomiting.  Genitourinary: Negative for dysuria, urgency and frequency.       Exam:  Blood pressure 102/62, pulse 84, temperature 36.9 °C (98.5 °F), resp. rate 18, height 1.549 m (5' 1\"), weight 46.3 kg (102 lb), SpO2 97 %.  General: Well developed, well nourished. No distress.  HEENT: Head is grossly normal.  Pulmonary: Clear to ausculation and percussion.  Normal effort. No rales, ronchi, or wheezing.   Cardiovascular: Regular rate and rhythm without murmur.   Back: No localized tenderness noted. Patient notes pain in the right-sided ribs, mid axillary region radiating around to the upper thoracic region on the right side.  Abdomen: Soft, nondistended. No hepatosplenomegaly.  Bowel sounds within normal limits. Right upper quadrant tenderness without guarding or rebound.  Extremities: No localized right shoulder pain the patient complains of generalized achiness that is worse with the right upper quadrant pain.  Neurologic: Grossly nonfocal.  Skin: Warm, dry, good turgor. No rashes in visible areas.   Psych: Normal mood. Alert and oriented x3. Judgment and insight is normal.    UA: Negative    Assessment/Plan:  Patient declines ultrasound as she is unable to get into [facility] today. Patient will schedule this as an outpatient at her convenience. Discussed ER precautions for any worsening symptoms.  1. RUQ pain  US-GALLBLADDER    ibuprofen (MOTRIN) 600 MG Tab   2. Suprapubic pain  POCT Urinalysis    ibuprofen (MOTRIN) 600 MG Tab   3. Acute pain of right shoulder  US-GALLBLADDER    ibuprofen (MOTRIN) 600 MG Tab       "

## 2018-05-09 ENCOUNTER — APPOINTMENT (OUTPATIENT)
Dept: INTERNAL MEDICINE | Facility: MEDICAL CENTER | Age: 23
End: 2018-05-09
Payer: MEDICAID

## 2018-05-27 ENCOUNTER — OFFICE VISIT (OUTPATIENT)
Dept: URGENT CARE | Facility: PHYSICIAN GROUP | Age: 23
End: 2018-05-27
Payer: MEDICAID

## 2018-05-27 VITALS
SYSTOLIC BLOOD PRESSURE: 102 MMHG | HEART RATE: 76 BPM | TEMPERATURE: 97.9 F | DIASTOLIC BLOOD PRESSURE: 60 MMHG | RESPIRATION RATE: 16 BRPM | HEIGHT: 61 IN | BODY MASS INDEX: 19.63 KG/M2 | WEIGHT: 104 LBS | OXYGEN SATURATION: 99 %

## 2018-05-27 DIAGNOSIS — R11.0 NAUSEA: ICD-10-CM

## 2018-05-27 DIAGNOSIS — G43.809 OTHER MIGRAINE WITHOUT STATUS MIGRAINOSUS, NOT INTRACTABLE: ICD-10-CM

## 2018-05-27 PROCEDURE — 99214 OFFICE O/P EST MOD 30 MIN: CPT | Performed by: PHYSICIAN ASSISTANT

## 2018-05-27 RX ORDER — BUTALBITAL, ACETAMINOPHEN AND CAFFEINE 300; 40; 50 MG/1; MG/1; MG/1
1 CAPSULE ORAL EVERY 12 HOURS PRN
Qty: 10 CAP | Refills: 0 | Status: SHIPPED | OUTPATIENT
Start: 2018-05-27 | End: 2018-09-09

## 2018-05-27 RX ORDER — ONDANSETRON 4 MG/1
4 TABLET, ORALLY DISINTEGRATING ORAL EVERY 8 HOURS PRN
Qty: 20 TAB | Refills: 0 | Status: SHIPPED | OUTPATIENT
Start: 2018-05-27 | End: 2018-08-29

## 2018-05-27 RX ORDER — ONDANSETRON 4 MG/1
4 TABLET, ORALLY DISINTEGRATING ORAL ONCE
Status: COMPLETED | OUTPATIENT
Start: 2018-05-27 | End: 2018-05-27

## 2018-05-27 RX ORDER — KETOROLAC TROMETHAMINE 30 MG/ML
60 INJECTION, SOLUTION INTRAMUSCULAR; INTRAVENOUS ONCE
Status: COMPLETED | OUTPATIENT
Start: 2018-05-27 | End: 2018-05-27

## 2018-05-27 RX ADMIN — KETOROLAC TROMETHAMINE 60 MG: 30 INJECTION, SOLUTION INTRAMUSCULAR; INTRAVENOUS at 11:07

## 2018-05-27 RX ADMIN — ONDANSETRON 4 MG: 4 TABLET, ORALLY DISINTEGRATING ORAL at 11:07

## 2018-05-27 ASSESSMENT — PAIN SCALES - GENERAL: PAINLEVEL: 10=SEVERE PAIN

## 2018-05-27 NOTE — PROGRESS NOTES
Chief Complaint   Patient presents with   • Migraine       HISTORY OF PRESENT ILLNESS: Patient is a 23 y.o. female who presents today for the following:    Migraine x 3 days  c/o pain in bilateral temples and occipital region - typical for her migraines  No h/o using imitrex/maxalt type medications  OTC meds tried: ibuprofen, excedrin, APAP - not helping   Has used fioricet in the past  PCP in [place]  LMP: 11/2016; hysterectomy 2016 for endometriosis  Toradol has helped only a small amount in the past  + nausea       There are no active problems to display for this patient.      Allergies:Morphine    Current Outpatient Prescriptions Ordered in [facility]   Medication Sig Dispense Refill   • ondansetron (ZOFRAN ODT) 4 MG TABLET DISPERSIBLE Take 1 Tab by mouth every 8 hours as needed. 20 Tab 0   • acetaminophen/caffeine/butalbital 300-40-50 mg (FIORICET) -40 MG Cap capsule Take 1 Cap by mouth every 12 hours as needed for Headache. 10 Cap 0   • ibuprofen (MOTRIN) 600 MG Tab Take 1 Tab by mouth every 6 hours as needed for Mild Pain or Moderate Pain. 30 Tab 0   • ondansetron (ZOFRAN ODT) 4 MG TABLET DISPERSIBLE 1 TAB, ALLOW TO DISINTEGRATE IN MOUTH, EVERY 8 HOURS ONLY IF NEEDED FOR NAUSEA OR VOMITING. 15 Tab 0   • acetaminophen/caffeine/butalbital 325-40-50 mg (FIORICET) -40 MG Tab 1 TAB EVERY 6 HOURS ONLY IF NEEDED FOR HEADACHE. 20 Tab 0   • gabapentin (NEURONTIN) 300 MG Cap Take 300 mg by mouth 3 times a day.     • citalopram (CELEXA) 10 MG tablet Take 10 mg by mouth every day.       Current Facility-Administered Medications Ordered in [facility]   Medication Dose Route Frequency Provider Last Rate Last Dose   • ketorolac (TORADOL) injection 60 mg  60 mg Intramuscular Once Niecy Hammer, P.A.-C.       • ondansetron (ZOFRAN ODT) dispertab 4 mg  4 mg Oral Once Niecy Hammer, P.A.-C.           Past Medical History:   Diagnosis Date   • Endometriosis        Social History   Substance Use Topics   • Smoking status:  "Current Every Day Smoker     Packs/day: 0.50     Years: 6.00     Types: Cigarettes   • Smokeless tobacco: Never Used   • Alcohol use No       No family status information on file.   History reviewed. No pertinent family history.    Review of Systems:   Constitutional ROS: No unexpected change in weight, No weakness, No fatigue  Eye ROS: No recent significant change in vision, No eye pain, redness, discharge  Ear ROS: No drainage, No tinnitus or vertigo, No recent change in hearing  Mouth/Throat ROS: No teeth or gum problems, No bleeding gums, No tongue complaints  Neck ROS: No swollen glands, No significant pain in neck  Pulmonary ROS: No chronic cough, sputum, or hemoptysis, No dyspnea on exertion, No wheezing  Cardiovascular ROS: No diaphoresis, No edema, No palpitations  Gastrointestinal ROS: No change in bowel habits, No significant change in appetite, No nausea, vomiting, diarrhea, or constipation  Musculoskeletal/Extremities ROS: No peripheral edema, No pain, redness or swelling on the joints  Hematologic/Lymphatic ROS: No chills, No night sweats, No weight loss  Skin/Integumentary ROS: No edema, No evidence of rash, No itching      Exam:  Blood pressure 102/60, pulse 76, temperature 36.6 °C (97.9 °F), resp. rate 16, height 1.549 m (5' 1\"), weight 47.2 kg (104 lb), SpO2 99 %.  General: Well developed, well nourished. No distress.  Eye: PERRL/EOMI; conjunctivae clear, lids normal.   Neck: Full range of motion. No meningismus noted.  ENMT: Moist mucous membranes.  Pulmonary: Unlabored respiratory effort. Lungs clear to auscultation, no wheezes, no rhonchi.  Cardiovascular: Regular rate and rhythm without murmur. No edema.   Neurologic: Grossly nonfocal. No facial asymmetry noted.  Extremities:  strength is strong and equal bilaterally.  Skin: Warm, dry, good turgor. No rashes in visible areas.   Psych: Normal mood. Alert and oriented x3. Judgment and insight is normal.    Toradol 60 mg IM    Zofran 4 mg " ODT    Assessment/Plan:  Use all medications as directed. Recommend following up with primary care provider to discuss abortive type medications such as Imitrex or Maxalt. Follow-up for worsening or persistent symptoms.  1. Other migraine without status migrainosus, not intractable  ketorolac (TORADOL) injection 60 mg    acetaminophen/caffeine/butalbital 300-40-50 mg (FIORICET) -40 MG Cap capsule   2. Nausea  ondansetron (ZOFRAN ODT) 4 MG TABLET DISPERSIBLE    ondansetron (ZOFRAN ODT) dispertab 4 mg

## 2018-06-29 ENCOUNTER — HOSPITAL ENCOUNTER (EMERGENCY)
Facility: MEDICAL CENTER | Age: 23
End: 2018-06-29
Attending: EMERGENCY MEDICINE
Payer: MEDICAID

## 2018-06-29 ENCOUNTER — APPOINTMENT (OUTPATIENT)
Dept: RADIOLOGY | Facility: MEDICAL CENTER | Age: 23
End: 2018-06-29
Attending: EMERGENCY MEDICINE
Payer: MEDICAID

## 2018-06-29 ENCOUNTER — OFFICE VISIT (OUTPATIENT)
Dept: URGENT CARE | Facility: PHYSICIAN GROUP | Age: 23
End: 2018-06-29
Payer: MEDICAID

## 2018-06-29 VITALS
HEART RATE: 71 BPM | HEIGHT: 61 IN | WEIGHT: 107 LBS | DIASTOLIC BLOOD PRESSURE: 75 MMHG | RESPIRATION RATE: 18 BRPM | SYSTOLIC BLOOD PRESSURE: 119 MMHG | BODY MASS INDEX: 20.2 KG/M2 | OXYGEN SATURATION: 97 %

## 2018-06-29 VITALS
RESPIRATION RATE: 18 BRPM | OXYGEN SATURATION: 95 % | TEMPERATURE: 97.3 F | BODY MASS INDEX: 20.2 KG/M2 | WEIGHT: 107 LBS | HEIGHT: 61 IN | HEART RATE: 84 BPM | SYSTOLIC BLOOD PRESSURE: 120 MMHG | DIASTOLIC BLOOD PRESSURE: 88 MMHG

## 2018-06-29 DIAGNOSIS — R55 SYNCOPE, UNSPECIFIED SYNCOPE TYPE: Primary | ICD-10-CM

## 2018-06-29 DIAGNOSIS — R10.2 PELVIC PAIN: ICD-10-CM

## 2018-06-29 DIAGNOSIS — N93.9 VAGINAL BLEEDING: ICD-10-CM

## 2018-06-29 DIAGNOSIS — R10.9 ABDOMINAL WALL PAIN: ICD-10-CM

## 2018-06-29 DIAGNOSIS — R10.30 LOWER ABDOMINAL PAIN: ICD-10-CM

## 2018-06-29 LAB
ALBUMIN SERPL BCP-MCNC: 4 G/DL (ref 3.2–4.9)
ALBUMIN/GLOB SERPL: 1.5 G/DL
ALP SERPL-CCNC: 73 U/L (ref 30–99)
ALT SERPL-CCNC: 14 U/L (ref 2–50)
ANION GAP SERPL CALC-SCNC: 9 MMOL/L (ref 0–11.9)
APPEARANCE UR: CLEAR
AST SERPL-CCNC: 15 U/L (ref 12–45)
BASOPHILS # BLD AUTO: 0.3 % (ref 0–1.8)
BASOPHILS # BLD: 0.03 K/UL (ref 0–0.12)
BILIRUB SERPL-MCNC: 0.3 MG/DL (ref 0.1–1.5)
BILIRUB UR QL STRIP.AUTO: NEGATIVE
BUN SERPL-MCNC: 12 MG/DL (ref 8–22)
CALCIUM SERPL-MCNC: 9.5 MG/DL (ref 8.5–10.5)
CHLORIDE SERPL-SCNC: 109 MMOL/L (ref 96–112)
CO2 SERPL-SCNC: 22 MMOL/L (ref 20–33)
COLOR UR: YELLOW
CREAT SERPL-MCNC: 0.71 MG/DL (ref 0.5–1.4)
EOSINOPHIL # BLD AUTO: 0.15 K/UL (ref 0–0.51)
EOSINOPHIL NFR BLD: 1.6 % (ref 0–6.9)
ERYTHROCYTE [DISTWIDTH] IN BLOOD BY AUTOMATED COUNT: 46.8 FL (ref 35.9–50)
GLOBULIN SER CALC-MCNC: 2.7 G/DL (ref 1.9–3.5)
GLUCOSE SERPL-MCNC: 82 MG/DL (ref 65–99)
GLUCOSE UR STRIP.AUTO-MCNC: NEGATIVE MG/DL
HCG UR QL: NEGATIVE
HCT VFR BLD AUTO: 40.5 % (ref 37–47)
HGB BLD-MCNC: 13.7 G/DL (ref 12–16)
IMM GRANULOCYTES # BLD AUTO: 0.04 K/UL (ref 0–0.11)
IMM GRANULOCYTES NFR BLD AUTO: 0.4 % (ref 0–0.9)
KETONES UR STRIP.AUTO-MCNC: NEGATIVE MG/DL
LEUKOCYTE ESTERASE UR QL STRIP.AUTO: NEGATIVE
LIPASE SERPL-CCNC: 22 U/L (ref 11–82)
LYMPHOCYTES # BLD AUTO: 3.75 K/UL (ref 1–4.8)
LYMPHOCYTES NFR BLD: 39.5 % (ref 22–41)
MCH RBC QN AUTO: 32.2 PG (ref 27–33)
MCHC RBC AUTO-ENTMCNC: 33.8 G/DL (ref 33.6–35)
MCV RBC AUTO: 95.1 FL (ref 81.4–97.8)
MICRO URNS: NORMAL
MONOCYTES # BLD AUTO: 0.59 K/UL (ref 0–0.85)
MONOCYTES NFR BLD AUTO: 6.2 % (ref 0–13.4)
NEUTROPHILS # BLD AUTO: 4.94 K/UL (ref 2–7.15)
NEUTROPHILS NFR BLD: 52 % (ref 44–72)
NITRITE UR QL STRIP.AUTO: NEGATIVE
NRBC # BLD AUTO: 0 K/UL
NRBC BLD-RTO: 0 /100 WBC
PH UR STRIP.AUTO: 7.5 [PH]
PLATELET # BLD AUTO: 247 K/UL (ref 164–446)
PMV BLD AUTO: 10.4 FL (ref 9–12.9)
POTASSIUM SERPL-SCNC: 3.4 MMOL/L (ref 3.6–5.5)
PROT SERPL-MCNC: 6.7 G/DL (ref 6–8.2)
PROT UR QL STRIP: NEGATIVE MG/DL
RBC # BLD AUTO: 4.26 M/UL (ref 4.2–5.4)
RBC UR QL AUTO: NEGATIVE
SODIUM SERPL-SCNC: 140 MMOL/L (ref 135–145)
SP GR UR REFRACTOMETRY: 1
SP GR UR STRIP.AUTO: 1
UROBILINOGEN UR STRIP.AUTO-MCNC: 0.2 MG/DL
WBC # BLD AUTO: 9.5 K/UL (ref 4.8–10.8)

## 2018-06-29 PROCEDURE — 81025 URINE PREGNANCY TEST: CPT

## 2018-06-29 PROCEDURE — 700102 HCHG RX REV CODE 250 W/ 637 OVERRIDE(OP): Performed by: EMERGENCY MEDICINE

## 2018-06-29 PROCEDURE — 80053 COMPREHEN METABOLIC PANEL: CPT

## 2018-06-29 PROCEDURE — 81003 URINALYSIS AUTO W/O SCOPE: CPT

## 2018-06-29 PROCEDURE — 76830 TRANSVAGINAL US NON-OB: CPT

## 2018-06-29 PROCEDURE — 85025 COMPLETE CBC W/AUTO DIFF WBC: CPT

## 2018-06-29 PROCEDURE — A9270 NON-COVERED ITEM OR SERVICE: HCPCS | Performed by: EMERGENCY MEDICINE

## 2018-06-29 PROCEDURE — 83690 ASSAY OF LIPASE: CPT

## 2018-06-29 PROCEDURE — 99214 OFFICE O/P EST MOD 30 MIN: CPT | Performed by: PHYSICIAN ASSISTANT

## 2018-06-29 PROCEDURE — 99284 EMERGENCY DEPT VISIT MOD MDM: CPT

## 2018-06-29 RX ORDER — NAPROXEN 500 MG/1
500 TABLET ORAL 2 TIMES DAILY WITH MEALS
Qty: 20 TAB | Refills: 0 | Status: SHIPPED | OUTPATIENT
Start: 2018-06-29 | End: 2018-08-29

## 2018-06-29 RX ORDER — HYDROCODONE BITARTRATE AND ACETAMINOPHEN 5; 325 MG/1; MG/1
1 TABLET ORAL ONCE
Status: COMPLETED | OUTPATIENT
Start: 2018-06-29 | End: 2018-06-29

## 2018-06-29 RX ADMIN — HYDROCODONE BITARTRATE AND ACETAMINOPHEN 1 TABLET: 5; 325 TABLET ORAL at 21:40

## 2018-06-29 ASSESSMENT — ENCOUNTER SYMPTOMS
FEVER: 0
VOMITING: 0
NAUSEA: 0
SYNCOPE: 1
LOSS OF CONSCIOUSNESS: 1
LIGHT-HEADEDNESS: 1
SEIZURES: 0
DIZZINESS: 1
CHILLS: 0
ABDOMINAL PAIN: 1
DIARRHEA: 0

## 2018-06-29 ASSESSMENT — PAIN SCALES - GENERAL
PAINLEVEL_OUTOF10: 6
PAINLEVEL_OUTOF10: 8

## 2018-06-30 NOTE — ED PROVIDER NOTES
"ED Provider Note    CHIEF COMPLAINT  Chief Complaint   Patient presents with   • Abdominal Pain     Pt BIBA for c/o left side abd pain after lifting a bedframe around 1800. Pt had possible syncopal episode and noticed vaginal bleeding after. 60mg toradol given en route.       ANA Jules is a 23 y.o. female who presents for evaluation of left-sided pelvic pain acute onset while lifting a heavy wooden bed frame, she then had some vaginal bleeding, she states she is a history of \"stage IV endometriosis\" she had no pain prior to lifting his bed frame.  She had no vomiting, no back pain, no chest pain, she has no dysuria or hematuria no other complaints    REVIEW OF SYSTEMS  Negative for fever, rash, chest pain, dyspnea, nausea, vomiting, diarrhea, headache, focal weakness, focal numbness, focal tingling, back pain. All other systems are negative.     PAST MEDICAL HISTORY  Past Medical History:   Diagnosis Date   • Endometriosis        FAMILY HISTORY  No family history on file.    SOCIAL HISTORY  Social History   Substance Use Topics   • Smoking status: Current Every Day Smoker     Packs/day: 0.50     Years: 6.00     Types: Cigarettes   • Smokeless tobacco: Never Used   • Alcohol use No       SURGICAL HISTORY  Past Surgical History:   Procedure Laterality Date   • DILATION AND CURETTAGE     • HYSTERECTOMY LAPAROSCOPY     • LAPAROSCOPY      endometriosis       CURRENT MEDICATIONS  I personally reviewed the medication list in the charting documentation.     ALLERGIES  Allergies   Allergen Reactions   • Morphine Unspecified     \"makes my body burn and itch\"       MEDICAL RECORD  I have reviewed patient's medical record and pertinent results are listed above.      PHYSICAL EXAM  VITAL SIGNS: /75   Pulse 68   Resp 18   Ht 1.549 m (5' 1\")   Wt 48.5 kg (107 lb)   SpO2 98%   BMI 20.22 kg/m²    Constitutional: Well appearing patient in no acute distress.  Not toxic, nor ill in appearance.  HENT: Mucus " membranes moist.    Eyes: No scleral icterus. Normal conjunctiva   Neck: Supple, comfortable, nonpainful range of motion.   Cardiovascular: Regular heart rate and rhythm.   Thorax & Lungs: Chest is nontender.  Lungs are clear to auscultation with good air movement bilaterally.  No wheeze, rhonchi, nor rales.   Abdomen: Soft, some mild tenderness overlying the left pelvis, no rebound, no guarding, abdomen is nondistended, no mass or hernia appreciated.  Skin: Warm, dry. No rash appreciated  Extremities/Musculoskeletal: No sign of trauma. No asymmetric calf tenderness, erythema or edema. Normal range of motion   Neurologic: Alert & oriented. No focal deficits observed.   Psychiatric: Normal affect appropriate for the clinical situation.    DIAGNOSTIC STUDIES / PROCEDURES    LABS  Results for orders placed or performed during the hospital encounter of 06/29/18   CBC WITH DIFFERENTIAL   Result Value Ref Range    WBC 9.5 4.8 - 10.8 K/uL    RBC 4.26 4.20 - 5.40 M/uL    Hemoglobin 13.7 12.0 - 16.0 g/dL    Hematocrit 40.5 37.0 - 47.0 %    MCV 95.1 81.4 - 97.8 fL    MCH 32.2 27.0 - 33.0 pg    MCHC 33.8 33.6 - 35.0 g/dL    RDW 46.8 35.9 - 50.0 fL    Platelet Count 247 164 - 446 K/uL    MPV 10.4 9.0 - 12.9 fL    Neutrophils-Polys 52.00 44.00 - 72.00 %    Lymphocytes 39.50 22.00 - 41.00 %    Monocytes 6.20 0.00 - 13.40 %    Eosinophils 1.60 0.00 - 6.90 %    Basophils 0.30 0.00 - 1.80 %    Immature Granulocytes 0.40 0.00 - 0.90 %    Nucleated RBC 0.00 /100 WBC    Neutrophils (Absolute) 4.94 2.00 - 7.15 K/uL    Lymphs (Absolute) 3.75 1.00 - 4.80 K/uL    Monos (Absolute) 0.59 0.00 - 0.85 K/uL    Eos (Absolute) 0.15 0.00 - 0.51 K/uL    Baso (Absolute) 0.03 0.00 - 0.12 K/uL    Immature Granulocytes (abs) 0.04 0.00 - 0.11 K/uL    NRBC (Absolute) 0.00 K/uL   COMP METABOLIC PANEL   Result Value Ref Range    Sodium 140 135 - 145 mmol/L    Potassium 3.4 (L) 3.6 - 5.5 mmol/L    Chloride 109 96 - 112 mmol/L    Co2 22 20 - 33 mmol/L     Anion Gap 9.0 0.0 - 11.9    Glucose 82 65 - 99 mg/dL    Bun 12 8 - 22 mg/dL    Creatinine 0.71 0.50 - 1.40 mg/dL    Calcium 9.5 8.5 - 10.5 mg/dL    AST(SGOT) 15 12 - 45 U/L    ALT(SGPT) 14 2 - 50 U/L    Alkaline Phosphatase 73 30 - 99 U/L    Total Bilirubin 0.3 0.1 - 1.5 mg/dL    Albumin 4.0 3.2 - 4.9 g/dL    Total Protein 6.7 6.0 - 8.2 g/dL    Globulin 2.7 1.9 - 3.5 g/dL    A-G Ratio 1.5 g/dL   LIPASE   Result Value Ref Range    Lipase 22 11 - 82 U/L   URINALYSIS,CULTURE IF INDICATED   Result Value Ref Range    Color Yellow     Character Clear     Specific Gravity 1.005 <1.035    Ph 7.5 5.0 - 8.0    Glucose Negative Negative mg/dL    Ketones Negative Negative mg/dL    Protein Negative Negative mg/dL    Bilirubin Negative Negative    Urobilinogen, Urine 0.2 Negative    Nitrite Negative Negative    Leukocyte Esterase Negative Negative    Occult Blood Negative Negative    Micro Urine Req see below    ESTIMATED GFR   Result Value Ref Range    GFR If African American >60 >60 mL/min/1.73 m 2    GFR If Non African American >60 >60 mL/min/1.73 m 2   HCG QUALITATIVE UR (Lab)   Result Value Ref Range    Beta-Hcg Urine Negative Negative   REFRACTOMETER SG   Result Value Ref Range    Specific Gravity 1.004          RADIOLOGY     US-GYN-PELVIS TRANSVAGINAL   Final Result         1.  Postsurgical changes of hysterectomy and right oophorectomy, otherwise normal transvaginal appearance of the pelvis.   2.  Small free fluid collection in the right adnexa.            COURSE & MEDICAL DECISION MAKING  I have reviewed any medical record information, laboratory studies and radiographic results as noted above.  Differential diagnoses includes: Abdominal muscle strain, ovarian cyst, anemia, electrolyte abnormalities, UTI, pregnancy, ectopic pregnancy    Encounter Summary: This is a 23 y.o. female with acute onset of left-sided pelvic pain while lifting something heavy, on exam I do not appreciate a hernia or any distention, no mass, she  "had some vaginal bleeding with this and states she has a history of \"stage IV\" endometriosis, perhaps this is a muscle wall strain, certainly could still be a hernia without any sort of entrapment.  Will obtain an ultrasound of rule out coincidental cysts or other cause the pain that may have just began as she was exerting herself.  Will check blood work and a urine pregnancy as well, this is all unremarkable she will be given strict return instructions and discharged home in stable condition.      DISPOSITION: Discharge home in stable condition      FINAL IMPRESSION  1. Abdominal wall pain    2. Pelvic pain           This dictation was created using voice recognition software. The accuracy of the dictation is limited to the abilities of the software. I expect there may be some errors of grammar and possibly content. The nursing notes were reviewed and certain aspects of this information were incorporated into this note.    Electronically signed by: David Mcfarlane, 6/29/2018 9:13 PM      "

## 2018-06-30 NOTE — ED NOTES
Patient given discharge teaching and education. Verbalizes understanding. Given chance to ask questions, all questions answered. Educated patient of signs and symptoms to returned to ER, and educated patient they can return for any concerning symptoms. Prescriptions provided to patient. Education given to patient about medication. Patient states they have their belongings. Denies additional needs at this time. Reports pain is well controlled. Patient monitored every hour and PRN for safety and comfort. Patient ambulatory out of department.

## 2018-06-30 NOTE — PROGRESS NOTES
"Subjective:      Regan Jules is a 23 y.o. female who presents with Head Injury (Pt sytate she felt a pop in her abdomen, believes she may have felt a \"gush of blood\" and lost conciousness, Pt aware of iminent transport, Pt states she is now feeling fatigued and shaky)    PMH:  has a past medical history of Endometriosis.  MEDS:   Current Outpatient Prescriptions:   •  ondansetron (ZOFRAN ODT) 4 MG TABLET DISPERSIBLE, Take 1 Tab by mouth every 8 hours as needed., Disp: 20 Tab, Rfl: 0  •  acetaminophen/caffeine/butalbital 300-40-50 mg (FIORICET) -40 MG Cap capsule, Take 1 Cap by mouth every 12 hours as needed for Headache., Disp: 10 Cap, Rfl: 0  •  ibuprofen (MOTRIN) 600 MG Tab, Take 1 Tab by mouth every 6 hours as needed for Mild Pain or Moderate Pain., Disp: 30 Tab, Rfl: 0  •  ondansetron (ZOFRAN ODT) 4 MG TABLET DISPERSIBLE, 1 TAB, ALLOW TO DISINTEGRATE IN MOUTH, EVERY 8 HOURS ONLY IF NEEDED FOR NAUSEA OR VOMITING., Disp: 15 Tab, Rfl: 0  •  acetaminophen/caffeine/butalbital 325-40-50 mg (FIORICET) -40 MG Tab, 1 TAB EVERY 6 HOURS ONLY IF NEEDED FOR HEADACHE., Disp: 20 Tab, Rfl: 0  •  gabapentin (NEURONTIN) 300 MG Cap, Take 300 mg by mouth 3 times a day., Disp: , Rfl:   •  citalopram (CELEXA) 10 MG tablet, Take 10 mg by mouth every day., Disp: , Rfl:   ALLERGIES:   Allergies   Allergen Reactions   • Morphine Unspecified     \"makes my body burn and itch\"     SURGHX:   Past Surgical History:   Procedure Laterality Date   • DILATION AND CURETTAGE     • HYSTERECTOMY LAPAROSCOPY     • LAPAROSCOPY      endometriosis     SOCHX:  reports that she has been smoking Cigarettes.  She has a 3.00 pack-year smoking history. She has never used smokeless tobacco. She reports that she does not drink alcohol or use drugs.  FH: Reviewed with patient/family. Not pertinent to this complaint.          Patient presents with:  Head Injury: Pt sytate she felt a pop in her abdomen, believes she may have felt a \"gush of " "blood\" and lost conciousness, Pt aware of iminent transport, Pt states she is now feeling fatigued and shaky.    PT has history of hysterectomy 2 years ago secondary to sever endometriosis, has one remaining ovary, but can remember which one.             Syncope   This is a new problem. The current episode started today. The problem has been resolved. She lost consciousness for a period of less than 1 minute. Exacerbated by: abdominal pain  Associated symptoms include abdominal pain, dizziness, light-headedness and malaise/fatigue. Pertinent negatives include no fever, nausea or vomiting. Associated symptoms comments: Vaginal bleeding. She has tried nothing for the symptoms. The treatment provided no relief. There is no history of arrhythmia.       Review of Systems   Constitutional: Positive for malaise/fatigue. Negative for chills and fever.   Cardiovascular: Positive for syncope.   Gastrointestinal: Positive for abdominal pain. Negative for diarrhea, nausea and vomiting.   Genitourinary:        Vaginal bleeding     Neurological: Positive for dizziness, loss of consciousness and light-headedness. Negative for seizures.   All other systems reviewed and are negative.         Objective:     /88   Pulse 84   Temp 36.3 °C (97.3 °F)   Resp 18   Ht 1.549 m (5' 1\")   Wt 48.5 kg (107 lb)   SpO2 95%   BMI 20.22 kg/m²      Physical Exam   Constitutional: She is oriented to person, place, and time. She appears well-developed and well-nourished. No distress.   HENT:   Head: Normocephalic and atraumatic.   Nose: Nose normal.   Eyes: Conjunctivae and EOM are normal. Pupils are equal, round, and reactive to light.   Neck: Normal range of motion. Neck supple.   Cardiovascular: Normal rate.    Pulmonary/Chest: Effort normal.   Musculoskeletal: Normal range of motion.   Neurological: She is alert and oriented to person, place, and time. Gait normal.   Skin: Skin is warm and dry. Capillary refill takes less than 2 " seconds.   Psychiatric: She has a normal mood and affect.   Nursing note and vitals reviewed.         EMS arrived very quickly, so my exam was cursory and EKG was unable to be completed here in clinic. PT taken to ER for full evaluation of syncope.      Assessment/Plan:     1. Syncope, unspecified syncope type     2. Vaginal bleeding     3. Lower abdominal pain       PT requires evaluation and treatment at a facility that can provide a higher level of care due to acuity of illness/complaint and time of presentation to this clinic.

## 2018-06-30 NOTE — ED TRIAGE NOTES
Chief Complaint   Patient presents with   • Abdominal Pain     Pt BIBA for c/o left side abd pain after lifting a bedframe around 1800. Pt had possible syncopal episode and noticed vaginal bleeding after. 60mg toradol given en route.

## 2018-06-30 NOTE — ED NOTES
Assit nurse: Pregnancy test came back negative, lab recommends the serum since gravity is low per Chuck in lab

## 2018-08-29 ENCOUNTER — OFFICE VISIT (OUTPATIENT)
Dept: URGENT CARE | Facility: PHYSICIAN GROUP | Age: 23
End: 2018-08-29
Payer: MEDICAID

## 2018-08-29 VITALS
RESPIRATION RATE: 16 BRPM | WEIGHT: 104 LBS | HEIGHT: 61 IN | SYSTOLIC BLOOD PRESSURE: 130 MMHG | TEMPERATURE: 98 F | BODY MASS INDEX: 19.63 KG/M2 | DIASTOLIC BLOOD PRESSURE: 78 MMHG | HEART RATE: 67 BPM | OXYGEN SATURATION: 98 %

## 2018-08-29 DIAGNOSIS — R11.0 NAUSEA: ICD-10-CM

## 2018-08-29 DIAGNOSIS — M62.838 CERVICAL PARASPINAL MUSCLE SPASM: Primary | ICD-10-CM

## 2018-08-29 PROCEDURE — 99214 OFFICE O/P EST MOD 30 MIN: CPT | Performed by: PHYSICIAN ASSISTANT

## 2018-08-29 RX ORDER — CYCLOBENZAPRINE HCL 5 MG
5 TABLET ORAL 3 TIMES DAILY PRN
Qty: 15 TAB | Refills: 0 | Status: SHIPPED | OUTPATIENT
Start: 2018-08-29 | End: 2018-09-01

## 2018-08-29 RX ORDER — IBUPROFEN 800 MG/1
800 TABLET ORAL EVERY 8 HOURS PRN
Qty: 12 TAB | Refills: 0 | Status: SHIPPED | OUTPATIENT
Start: 2018-08-29 | End: 2018-09-01

## 2018-08-29 RX ORDER — ALPRAZOLAM 0.5 MG/1
0.5 TABLET ORAL NIGHTLY PRN
COMMUNITY
End: 2018-09-09

## 2018-08-29 RX ORDER — HYDROXYZINE 50 MG/1
50 TABLET, FILM COATED ORAL 3 TIMES DAILY PRN
COMMUNITY

## 2018-08-29 RX ORDER — ONDANSETRON 4 MG/1
TABLET, ORALLY DISINTEGRATING ORAL
Qty: 15 TAB | Refills: 0 | Status: SHIPPED | OUTPATIENT
Start: 2018-08-29 | End: 2018-09-09

## 2018-08-29 ASSESSMENT — ENCOUNTER SYMPTOMS
SORE THROAT: 0
CONSTIPATION: 0
DIARRHEA: 0
BACK PAIN: 1
HEADACHES: 0
CHANGE IN BOWEL HABIT: 0
DIZZINESS: 0
SWOLLEN GLANDS: 0
ANOREXIA: 0
COUGH: 1
FALLS: 0
SHORTNESS OF BREATH: 0
FEVER: 0
NUMBNESS: 1
NAUSEA: 0
CHILLS: 1
VOMITING: 1
MYALGIAS: 1
TINGLING: 0
ABDOMINAL PAIN: 1
NECK PAIN: 1
WEAKNESS: 0

## 2018-08-29 ASSESSMENT — PAIN SCALES - GENERAL: PAINLEVEL: 10=SEVERE PAIN

## 2018-08-29 NOTE — PROGRESS NOTES
"Subjective:   Regan Jules is a 23 y.o. female who presents for Pain (right side of her body most pain in her shoulder an neck)    Shoulder Pain   This is a new problem. The current episode started yesterday (7 am yesterday). The problem occurs constantly. The problem has been gradually worsening. Associated symptoms include abdominal pain, chills, coughing, myalgias, neck pain, numbness and vomiting. Pertinent negatives include no anorexia, change in bowel habit, congestion, fever, headaches, nausea, rash, sore throat, swollen glands, urinary symptoms or weakness. Exacerbated by: Movement  Treatments tried: Tramadol, lidocaine patch and tylenol  The treatment provided no relief.     First episode of its kind, pt was sleeping and woke up with neck and shoulder pain. She does not recall an injury to area. She states pain does not radiate. She is concerned that sx in neck could be cause from a blood clot. She also concerned for blood clot in leg. She describes a \"slight\" chest pain.     Pt has been coughing for one month, she states it started after Lupron injection for \"stage IV endometriosis.\" Lupron rx by [facility]. [facility] she has a  follow up schedule next week on 9/7.     Denies exposure to flu, strep or mono. Denies SOB, difficulty breathing.     Review of Systems   Constitutional: Positive for chills. Negative for fever and malaise/fatigue.   HENT: Negative for congestion and sore throat.    Respiratory: Positive for cough. Negative for shortness of breath.    Gastrointestinal: Positive for abdominal pain and vomiting. Negative for anorexia, change in bowel habit, constipation, diarrhea and nausea.        Chronic abdominal pain from endometriosis      Musculoskeletal: Positive for back pain, myalgias and neck pain. Negative for falls.   Skin: Negative for rash.   Neurological: Positive for numbness. Negative for dizziness, tingling, weakness and headaches.   All other systems reviewed and are " "negative.      PMH:  has a past medical history of Endometriosis.  MEDS:   Current Outpatient Prescriptions:   •  ALPRAZolam (XANAX) 0.5 MG Tab, Take 0.5 mg by mouth at bedtime as needed for Sleep., Disp: , Rfl:   •  sertraline (ZOLOFT) 50 MG Tab, Take 50 mg by mouth every day., Disp: , Rfl:   •  cyclobenzaprine (FLEXERIL) 5 MG tablet, Take 1 Tab by mouth 3 times a day as needed for up to 3 days., Disp: 15 Tab, Rfl: 0  •  ondansetron (ZOFRAN ODT) 4 MG TABLET DISPERSIBLE, 1 TAB, ALLOW TO DISINTEGRATE IN MOUTH, EVERY 8 HOURS ONLY IF NEEDED FOR NAUSEA OR VOMITING., Disp: 15 Tab, Rfl: 0  •  ibuprofen (MOTRIN) 800 MG Tab, Take 1 Tab by mouth every 8 hours as needed for up to 3 days., Disp: 12 Tab, Rfl: 0  •  leuprolide (LUPRON DEPOT, 3-MONTH,) 11.25 MG injection, 11.25 mg by Intramuscular route Once., Disp: , Rfl:   •  hydrOXYzine HCl (ATARAX) 50 MG Tab, Take 50 mg by mouth 3 times a day as needed for Itching., Disp: , Rfl:   •  acetaminophen/caffeine/butalbital 300-40-50 mg (FIORICET) -40 MG Cap capsule, Take 1 Cap by mouth every 12 hours as needed for Headache., Disp: 10 Cap, Rfl: 0  •  acetaminophen/caffeine/butalbital 325-40-50 mg (FIORICET) -40 MG Tab, 1 TAB EVERY 6 HOURS ONLY IF NEEDED FOR HEADACHE., Disp: 20 Tab, Rfl: 0  •  gabapentin (NEURONTIN) 300 MG Cap, Take 300 mg by mouth 3 times a day., Disp: , Rfl:   •  citalopram (CELEXA) 10 MG tablet, Take 10 mg by mouth every day., Disp: , Rfl:   ALLERGIES:   Allergies   Allergen Reactions   • Morphine Unspecified     \"makes my body burn and itch\"     SURGHX:   Past Surgical History:   Procedure Laterality Date   • DILATION AND CURETTAGE     • HYSTERECTOMY LAPAROSCOPY     • LAPAROSCOPY      endometriosis     SOCHX:  reports that she has been smoking Cigarettes.  She has a 3.00 pack-year smoking history. She has never used smokeless tobacco. She reports that she drinks alcohol. She reports that she does not use drugs.  History reviewed. No pertinent family " "history.     Objective:   /78   Pulse 67   Temp 36.7 °C (98 °F)   Resp 16   Ht 1.549 m (5' 1\")   Wt 47.2 kg (104 lb)   SpO2 98%   BMI 19.65 kg/m²     Physical Exam   Constitutional: She is oriented to person, place, and time. She appears well-developed and well-nourished. No distress.   HENT:   Head: Normocephalic and atraumatic.   Right Ear: External ear normal.   Left Ear: External ear normal.   Mouth/Throat: Posterior oropharyngeal erythema present. No oropharyngeal exudate or posterior oropharyngeal edema.   Eyes: Pupils are equal, round, and reactive to light. Conjunctivae are normal.   Neck: Neck supple.   Cardiovascular: Normal rate and regular rhythm.    Pulmonary/Chest: Effort normal and breath sounds normal. No stridor.   Musculoskeletal:        Right shoulder: She exhibits pain and spasm. She exhibits no bony tenderness, no swelling, no effusion, no crepitus, no deformity, normal pulse and normal strength.        Right elbow: Normal.       Left elbow: Normal.        Right wrist: Normal.        Left wrist: Normal.        Arms:  ROM and strength intact in all extremities. Negative marya's R lower extremity.  strength intact. Negative Neer's, empty can, drop arm and Gallegos' rakesh bilateral upper extremity. No lower extremity edema, swelling or erythema. DTR 2/4 bilateral.    Lymphadenopathy:     She has no cervical adenopathy.   Neurological: She is alert and oriented to person, place, and time. She displays normal reflexes. No cranial nerve deficit or sensory deficit. She exhibits normal muscle tone. Coordination normal.   Skin: Skin is warm and dry. Capillary refill takes less than 2 seconds.   Psychiatric: She has a normal mood and affect. Her behavior is normal.   Vitals reviewed.        Assessment/Plan:     1. Cervical paraspinal muscle spasm  cyclobenzaprine (FLEXERIL) 5 MG tablet    ibuprofen (MOTRIN) 800 MG Tab   2. Nausea  ondansetron (ZOFRAN ODT) 4 MG TABLET DISPERSIBLE     Pt " directed to start flexeril and ibuprofen if sx do not improve in 24-48 hours pt directed to return to clinic for reevaluation, consider imaging at this point. Strict follow up precautions reviewed as well as red flags and emergency room precautions. Patient and partner appear understanding of information.     Pt was advised to not taking flexeril and xanax together it may cause increased sedation. Pt only taking xanax prn for panic attacks. Pt. was advised to avoid the operation of heavy machine along with driving while on such medications. Finally pt. was advised to use medication only as prescribed.     Pt directed to follow up with Gyn as schedule and follow up with primary care provider in 7-10 days.

## 2018-09-09 ENCOUNTER — OFFICE VISIT (OUTPATIENT)
Dept: URGENT CARE | Facility: PHYSICIAN GROUP | Age: 23
End: 2018-09-09
Payer: MEDICAID

## 2018-09-09 VITALS
HEART RATE: 84 BPM | BODY MASS INDEX: 20.01 KG/M2 | RESPIRATION RATE: 16 BRPM | OXYGEN SATURATION: 99 % | DIASTOLIC BLOOD PRESSURE: 70 MMHG | TEMPERATURE: 98 F | WEIGHT: 106 LBS | HEIGHT: 61 IN | SYSTOLIC BLOOD PRESSURE: 108 MMHG

## 2018-09-09 DIAGNOSIS — S16.1XXD CERVICAL MYOFASCIAL STRAIN, SUBSEQUENT ENCOUNTER: ICD-10-CM

## 2018-09-09 DIAGNOSIS — S46.911D STRAIN OF RIGHT SHOULDER, SUBSEQUENT ENCOUNTER: ICD-10-CM

## 2018-09-09 PROCEDURE — 99214 OFFICE O/P EST MOD 30 MIN: CPT | Performed by: PHYSICIAN ASSISTANT

## 2018-09-09 RX ORDER — TIZANIDINE 4 MG/1
4 TABLET ORAL EVERY 6 HOURS PRN
Qty: 30 TAB | Refills: 3 | Status: SHIPPED | OUTPATIENT
Start: 2018-09-09

## 2018-09-09 NOTE — PROGRESS NOTES
Chief Complaint   Patient presents with   • Shoulder Pain     R side x1wk/ non injury related       HISTORY OF PRESENT ILLNESS: Patient is a 23 y.o. female who presents today for the following:    Right side neck pain/right shoulder pain x around 8/27  Seen 8/29 - put on flexeril - helped a little bit  Pain is now worse  Seems to have decreased ROM since last visit  Entire RUE has numbness/tingling; comes and goes unaffected by position  Denies any STS of RUE  No h/o injury to right shoulder/neck  OTC meds tried: APAP 1000mg at a time; ibuprofen 800mg  + ice, heat, muscle rubs       There are no active problems to display for this patient.      Allergies:Morphine    Current Outpatient Prescriptions Ordered in [facility]   Medication Sig Dispense Refill   • tizanidine (ZANAFLEX) 4 MG Tab Take 1 Tab by mouth every 6 hours as needed (pain). May cause drowsiness; do not take with flexeril or xanax 30 Tab 3   • sertraline (ZOLOFT) 50 MG Tab Take 50 mg by mouth every day.     • hydrOXYzine HCl (ATARAX) 50 MG Tab Take 50 mg by mouth 3 times a day as needed for Itching.       No current [facility]-ordered facility-administered medications on file.        Past Medical History:   Diagnosis Date   • Endometriosis        Social History   Substance Use Topics   • Smoking status: Current Every Day Smoker     Packs/day: 0.50     Years: 6.00     Types: Cigarettes   • Smokeless tobacco: Never Used   • Alcohol use Yes      Comment: occ       No family status information on file.   No family history on file.    Review of Systems:   Constitutional ROS: No unexpected change in weight, No weakness, No fatigue  Eye ROS: No recent significant change in vision, No eye pain, redness, discharge  Ear ROS: No drainage, No tinnitus or vertigo, No recent change in hearing  Mouth/Throat ROS: No teeth or gum problems, No bleeding gums, No tongue complaints  Neck ROS: No swollen glands, No significant pain in neck  Pulmonary ROS: No chronic cough, sputum,  "or hemoptysis, No dyspnea on exertion, No wheezing  Cardiovascular ROS: No diaphoresis, No edema, No palpitations  Gastrointestinal ROS: No change in bowel habits, No significant change in appetite, No nausea, vomiting, diarrhea, or constipation  Hematologic/Lymphatic ROS: No chills, No night sweats, No weight loss  Skin/Integumentary ROS: No edema, No evidence of rash, No itching      Exam:  Blood pressure 108/70, pulse 84, temperature 36.7 °C (98 °F), resp. rate 16, height 1.549 m (5' 1\"), weight 48.1 kg (106 lb), SpO2 99 %.  General: Well developed, well nourished. No distress.  HEENT: Head is grossly normal.  Neck: Mild tenderness on the posterior lateral aspect on the right side only.  Full range of motion.  No vertebral tenderness noted.  Pulmonary: Unlabored respiratory effort.    Cardiovascular: Radial pulses are strong and equal bilaterally.  Extremities: Almost full range of motion right shoulder.  Unable to extend fully overhead.  Tenderness primarily in the suprascapular region but extends into the anterior aspect.   strength is strong and equal bilaterally.  No soft tissue swelling, ecchymosis, erythema, or skin changes noted.  Neurologic: Grossly nonfocal. No facial asymmetry noted.  Skin: Warm, dry, good turgor. No rashes in visible areas.   Psych: Normal mood. Alert and oriented x3. Judgment and insight is normal.    Assessment/Plan:  Patient's pain seems to be very muscular in nature, primarily the trapezius on the right side.  We will try changing muscle relaxer and refer to physical therapy.  Follow-up for worsening or persistent symptoms.  1. Strain of right shoulder, subsequent encounter  tizanidine (ZANAFLEX) 4 MG Tab    REFERRAL TO PHYSICAL THERAPY Reason for Therapy: Eval/Treat/Report   2. Cervical myofascial strain, subsequent encounter  tizanidine (ZANAFLEX) 4 MG Tab    REFERRAL TO PHYSICAL THERAPY Reason for Therapy: Eval/Treat/Report       "

## 2018-10-22 ENCOUNTER — NON-PROVIDER VISIT (OUTPATIENT)
Dept: URGENT CARE | Facility: PHYSICIAN GROUP | Age: 23
End: 2018-10-22

## 2018-10-22 DIAGNOSIS — Z02.1 PRE-EMPLOYMENT DRUG SCREENING: ICD-10-CM

## 2018-10-22 LAB
AMP AMPHETAMINE: NORMAL
COC COCAINE: NORMAL
INT CON NEG: NORMAL
INT CON POS: NORMAL
MET METHAMPHETAMINES: NORMAL
OPI OPIATES: NORMAL
PCP PHENCYCLIDINE: NORMAL
POC DRUG COMMENT 753798-OCCUPATIONAL HEALTH: NEGATIVE
THC: NORMAL

## 2018-10-22 PROCEDURE — 80305 DRUG TEST PRSMV DIR OPT OBS: CPT | Performed by: PHYSICIAN ASSISTANT

## 2018-11-23 ENCOUNTER — OFFICE VISIT (OUTPATIENT)
Dept: URGENT CARE | Facility: PHYSICIAN GROUP | Age: 23
End: 2018-11-23
Payer: MEDICAID

## 2018-11-23 ENCOUNTER — HOSPITAL ENCOUNTER (OUTPATIENT)
Facility: MEDICAL CENTER | Age: 23
End: 2018-11-23
Attending: PHYSICIAN ASSISTANT
Payer: MEDICAID

## 2018-11-23 VITALS
OXYGEN SATURATION: 98 % | RESPIRATION RATE: 12 BRPM | DIASTOLIC BLOOD PRESSURE: 58 MMHG | HEIGHT: 61 IN | BODY MASS INDEX: 21.71 KG/M2 | SYSTOLIC BLOOD PRESSURE: 118 MMHG | WEIGHT: 115 LBS | HEART RATE: 70 BPM | TEMPERATURE: 97.4 F

## 2018-11-23 DIAGNOSIS — Z20.2 STD EXPOSURE: ICD-10-CM

## 2018-11-23 DIAGNOSIS — G43.809 OTHER MIGRAINE WITHOUT STATUS MIGRAINOSUS, NOT INTRACTABLE: ICD-10-CM

## 2018-11-23 DIAGNOSIS — R11.0 NAUSEA: ICD-10-CM

## 2018-11-23 PROCEDURE — 87510 GARDNER VAG DNA DIR PROBE: CPT

## 2018-11-23 PROCEDURE — 87591 N.GONORRHOEAE DNA AMP PROB: CPT

## 2018-11-23 PROCEDURE — 87491 CHLMYD TRACH DNA AMP PROBE: CPT

## 2018-11-23 PROCEDURE — 87660 TRICHOMONAS VAGIN DIR PROBE: CPT

## 2018-11-23 PROCEDURE — 87480 CANDIDA DNA DIR PROBE: CPT

## 2018-11-23 PROCEDURE — 99214 OFFICE O/P EST MOD 30 MIN: CPT | Performed by: PHYSICIAN ASSISTANT

## 2018-11-23 RX ORDER — KETOROLAC TROMETHAMINE 30 MG/ML
60 INJECTION, SOLUTION INTRAMUSCULAR; INTRAVENOUS ONCE
Status: COMPLETED | OUTPATIENT
Start: 2018-11-23 | End: 2018-11-23

## 2018-11-23 RX ORDER — ONDANSETRON 4 MG/1
4 TABLET, ORALLY DISINTEGRATING ORAL EVERY 8 HOURS PRN
Qty: 20 TAB | Refills: 0 | Status: SHIPPED | OUTPATIENT
Start: 2018-11-23

## 2018-11-23 RX ORDER — ONDANSETRON 4 MG/1
4 TABLET, ORALLY DISINTEGRATING ORAL ONCE
Status: COMPLETED | OUTPATIENT
Start: 2018-11-23 | End: 2018-11-23

## 2018-11-23 RX ORDER — BUTALBITAL, ACETAMINOPHEN AND CAFFEINE 300; 40; 50 MG/1; MG/1; MG/1
1 CAPSULE ORAL EVERY 12 HOURS PRN
Qty: 10 CAP | Refills: 0 | Status: SHIPPED | OUTPATIENT
Start: 2018-11-23 | End: 2018-11-28

## 2018-11-23 RX ORDER — BUTALBITAL, ACETAMINOPHEN AND CAFFEINE 300; 40; 50 MG/1; MG/1; MG/1
1 CAPSULE ORAL EVERY 12 HOURS PRN
Qty: 10 CAP | Refills: 0 | Status: SHIPPED | OUTPATIENT
Start: 2018-11-23 | End: 2018-11-23 | Stop reason: SDUPTHER

## 2018-11-23 RX ADMIN — KETOROLAC TROMETHAMINE 60 MG: 30 INJECTION, SOLUTION INTRAMUSCULAR; INTRAVENOUS at 15:39

## 2018-11-23 RX ADMIN — ONDANSETRON 4 MG: 4 TABLET, ORALLY DISINTEGRATING ORAL at 15:40

## 2018-11-23 NOTE — PROGRESS NOTES
Chief Complaint   Patient presents with   • Nausea   • Migraine     x 4 days    • Exposure to STD     exposed to gonorrhea.  Pt stated she has burning during urination and intercourse        HISTORY OF PRESENT ILLNESS: Patient is a 23 y.o. female who presents today for the following:    Patient comes in for evaluation of 2 separate issues:    Migraine  Patient suffers from chronic migraines and has had one for the past 4 days.  She states it is in the same location and follows the same pattern as her typical migraines.  She does report some noise and light sensitivity.  She is out of nausea medicine at home.  She does say that Toradol helps take the edge off.  She has used Fioricet in the past with relief of her symptoms.    STD exposure  Patient states her  informed her that he does have positive for gonorrhea and that he has been cheating on her.  Patient reports vaginal itching but denies any discharge and vaginal pain.  She does have some abdominal pain but suffers from chronic abdominal pain due to endometriosis requiring Lupron injections.  She denies any change in her chronic abdominal pain.  She denies fever, chills, and chills.  Patient is status post hysterectomy due to endometriosis.    There are no active problems to display for this patient.      Allergies:Morphine    Current Outpatient Prescriptions Ordered in [facility]   Medication Sig Dispense Refill   • ondansetron (ZOFRAN ODT) 4 MG TABLET DISPERSIBLE Take 1 Tab by mouth every 8 hours as needed for Nausea. 20 Tab 0   • acetaminophen/caffeine/butalbital 300-40-50 mg (FIORICET) -40 MG Cap capsule Take 1 Cap by mouth every 12 hours as needed for Headache for up to 5 days. 10 Cap 0   • tizanidine (ZANAFLEX) 4 MG Tab Take 1 Tab by mouth every 6 hours as needed (pain). May cause drowsiness; do not take with flexeril or xanax (Patient not taking: Reported on 11/23/2018) 30 Tab 3   • sertraline (ZOLOFT) 50 MG Tab Take 50 mg by mouth every day.     •  "hydrOXYzine HCl (ATARAX) 50 MG Tab Take 50 mg by mouth 3 times a day as needed for Itching.       Current Facility-Administered Medications Ordered in Epic   Medication Dose Route Frequency Provider Last Rate Last Dose   • ketorolac (TORADOL) injection 60 mg  60 mg Intramuscular Once Niecy Hammer P.A.-C.       • ondansetron (ZOFRAN ODT) dispertab 4 mg  4 mg Oral Once Niecy Hammer P.A.-C.           Past Medical History:   Diagnosis Date   • Endometriosis        Social History   Substance Use Topics   • Smoking status: Current Every Day Smoker     Packs/day: 0.50     Years: 6.00     Types: Cigarettes   • Smokeless tobacco: Never Used   • Alcohol use Yes      Comment: occ       No family status information on file.   No family history on file.    Review of Systems:   Constitutional ROS: No unexpected change in weight, No weakness, No fatigue  Eye ROS: No recent significant change in vision, No eye pain, redness, discharge  Ear ROS: No drainage, No tinnitus or vertigo, No recent change in hearing  Mouth/Throat ROS: No teeth or gum problems, No bleeding gums, No tongue complaints  Neck ROS: No swollen glands, No significant pain in neck  Pulmonary ROS: No chronic cough, sputum, or hemoptysis, No dyspnea on exertion, No wheezing  Cardiovascular ROS: No diaphoresis, No edema, No palpitations  Musculoskeletal/Extremities ROS: No peripheral edema, No pain, redness or swelling on the joints  Hematologic/Lymphatic ROS: No chills, No night sweats, No weight loss  Skin/Integumentary ROS: No edema, No evidence of rash, No itching      Exam:  Blood pressure 118/58, pulse 70, temperature 36.3 °C (97.4 °F), temperature source Temporal, resp. rate 12, height 1.549 m (5' 1\"), weight 52.2 kg (115 lb), SpO2 98 %.  General: Well developed, well nourished.  No acute distress but appears to be uncomfortable.  Somewhat flat affect when discussing situation with her STD exposure.  Eye: PERRL/EOMI; conjunctivae clear, lids " normal.  Pulmonary: Unlabored respiratory effort.   : Pelvic exam deferred by patient.  Self swab.  Neurologic: Grossly nonfocal. No facial asymmetry noted.  Lymph: No cervical lymphadenopathy noted.  Skin: Warm, dry, good turgor. No rashes in visible areas.   Psych: Normal mood. Alert and oriented x3. Judgment and insight is normal.    Toradol 60 mg IM    Zofran 4 mg ODT    Assessment/Plan:  Discussed the option of treating now or waiting until results are available.  Patient states she would like to wait until results are available to be sure she is positive prior to treatment.  Patient understands to use all medication as directed and will follow-up for worsening or persistent symptoms.  1. Other migraine without status migrainosus, not intractable  ketorolac (TORADOL) injection 60 mg    acetaminophen/caffeine/butalbital 300-40-50 mg (FIORICET) -40 MG Cap capsule   2. STD exposure  CHLAMYDIA/GC AMP URINE OR SWAB    VAGINAL PATHOGENS DNA PANEL   3. Nausea  ondansetron (ZOFRAN ODT) dispertab 4 mg    ondansetron (ZOFRAN ODT) 4 MG TABLET DISPERSIBLE

## 2018-11-24 DIAGNOSIS — Z20.2 STD EXPOSURE: ICD-10-CM

## 2018-11-24 PROCEDURE — 87591 N.GONORRHOEAE DNA AMP PROB: CPT

## 2018-11-24 PROCEDURE — 87491 CHLMYD TRACH DNA AMP PROBE: CPT

## 2018-12-04 LAB
C TRACH DNA SPEC QL NAA+PROBE: NEGATIVE
N GONORRHOEA DNA SPEC QL NAA+PROBE: NEGATIVE
SPECIMEN SOURCE: NORMAL

## 2018-12-05 ENCOUNTER — TELEPHONE (OUTPATIENT)
Dept: URGENT CARE | Facility: PHYSICIAN GROUP | Age: 23
End: 2018-12-05

## 2018-12-05 NOTE — TELEPHONE ENCOUNTER
Left message on patient's phone number on record to call the [facility]. [phone] (home)       Patient's vaginal pathogen testing did not result.  GC/Chlamydia came back negative.  If patient wishes to be re-tested for vaginal pathogens, please have her come by the clinic for testing.

## 2018-12-05 NOTE — TELEPHONE ENCOUNTER
Pt called and was given the providers message. Patient declined to come back in for a re-swab, and stated she doesn't have additional Vaginal complains.